# Patient Record
Sex: FEMALE | Race: WHITE | NOT HISPANIC OR LATINO | URBAN - METROPOLITAN AREA
[De-identification: names, ages, dates, MRNs, and addresses within clinical notes are randomized per-mention and may not be internally consistent; named-entity substitution may affect disease eponyms.]

---

## 2020-09-10 ENCOUNTER — EMERGENCY (EMERGENCY)
Facility: HOSPITAL | Age: 70
LOS: 1 days | Discharge: HOME | End: 2020-09-10
Attending: EMERGENCY MEDICINE | Admitting: EMERGENCY MEDICINE
Payer: OTHER MISCELLANEOUS

## 2020-09-10 VITALS
DIASTOLIC BLOOD PRESSURE: 83 MMHG | OXYGEN SATURATION: 97 % | HEART RATE: 70 BPM | SYSTOLIC BLOOD PRESSURE: 132 MMHG | RESPIRATION RATE: 18 BRPM | TEMPERATURE: 100 F

## 2020-09-10 VITALS
DIASTOLIC BLOOD PRESSURE: 71 MMHG | HEIGHT: 64 IN | WEIGHT: 199.96 LBS | RESPIRATION RATE: 18 BRPM | SYSTOLIC BLOOD PRESSURE: 138 MMHG | TEMPERATURE: 100 F | HEART RATE: 79 BPM | OXYGEN SATURATION: 96 %

## 2020-09-10 DIAGNOSIS — S61.031A PUNCTURE WOUND WITHOUT FOREIGN BODY OF RIGHT THUMB WITHOUT DAMAGE TO NAIL, INITIAL ENCOUNTER: ICD-10-CM
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
## 2020-09-10 DIAGNOSIS — S50.811A ABRASION OF RIGHT FOREARM, INITIAL ENCOUNTER: ICD-10-CM
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
## 2020-09-10 DIAGNOSIS — S51.832A PUNCTURE WOUND WITHOUT FOREIGN BODY OF LEFT FOREARM, INITIAL ENCOUNTER: ICD-10-CM
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
## 2020-09-10 DIAGNOSIS — S51.851A OPEN BITE OF RIGHT FOREARM, INITIAL ENCOUNTER: ICD-10-CM

## 2020-09-10 DIAGNOSIS — S61.235A PUNCTURE WOUND WITHOUT FOREIGN BODY OF LEFT RING FINGER WITHOUT DAMAGE TO NAIL, INITIAL ENCOUNTER: ICD-10-CM

## 2020-09-10 DIAGNOSIS — Z79.2 LONG TERM (CURRENT) USE OF ANTIBIOTICS: ICD-10-CM

## 2020-09-10 LAB
ALBUMIN SERPL ELPH-MCNC: 4.1 G/DL — SIGNIFICANT CHANGE UP (ref 3.3–5)
ALP SERPL-CCNC: 92 U/L — SIGNIFICANT CHANGE UP (ref 40–120)
ALT FLD-CCNC: 13 U/L — SIGNIFICANT CHANGE UP (ref 10–45)
ANION GAP SERPL CALC-SCNC: 13 MMOL/L — SIGNIFICANT CHANGE UP (ref 5–17)
AST SERPL-CCNC: 21 U/L — SIGNIFICANT CHANGE UP (ref 10–40)
BASOPHILS # BLD AUTO: 0.08 K/UL — SIGNIFICANT CHANGE UP (ref 0–0.2)
BASOPHILS NFR BLD AUTO: 0.6 % — SIGNIFICANT CHANGE UP (ref 0–2)
BILIRUB SERPL-MCNC: 0.7 MG/DL — SIGNIFICANT CHANGE UP (ref 0.2–1.2)
BUN SERPL-MCNC: 15 MG/DL — SIGNIFICANT CHANGE UP (ref 7–23)
CALCIUM SERPL-MCNC: 9.5 MG/DL — SIGNIFICANT CHANGE UP (ref 8.4–10.5)
CHLORIDE SERPL-SCNC: 105 MMOL/L — SIGNIFICANT CHANGE UP (ref 96–108)
CO2 SERPL-SCNC: 22 MMOL/L — SIGNIFICANT CHANGE UP (ref 22–31)
CREAT SERPL-MCNC: 0.61 MG/DL — SIGNIFICANT CHANGE UP (ref 0.5–1.3)
EOSINOPHIL # BLD AUTO: 0.06 K/UL — SIGNIFICANT CHANGE UP (ref 0–0.5)
EOSINOPHIL NFR BLD AUTO: 0.4 % — SIGNIFICANT CHANGE UP (ref 0–6)
GLUCOSE SERPL-MCNC: 113 MG/DL — HIGH (ref 70–99)
HCT VFR BLD CALC: 45.8 % — HIGH (ref 34.5–45)
HGB BLD-MCNC: 14.8 G/DL — SIGNIFICANT CHANGE UP (ref 11.5–15.5)
IMM GRANULOCYTES NFR BLD AUTO: 0.6 % — SIGNIFICANT CHANGE UP (ref 0–1.5)
LACTATE SERPL-SCNC: 1 MMOL/L — SIGNIFICANT CHANGE UP (ref 0.5–2)
LYMPHOCYTES # BLD AUTO: 1.47 K/UL — SIGNIFICANT CHANGE UP (ref 1–3.3)
LYMPHOCYTES # BLD AUTO: 10.6 % — LOW (ref 13–44)
MCHC RBC-ENTMCNC: 29.8 PG — SIGNIFICANT CHANGE UP (ref 27–34)
MCHC RBC-ENTMCNC: 32.3 GM/DL — SIGNIFICANT CHANGE UP (ref 32–36)
MCV RBC AUTO: 92.3 FL — SIGNIFICANT CHANGE UP (ref 80–100)
MONOCYTES # BLD AUTO: 0.88 K/UL — SIGNIFICANT CHANGE UP (ref 0–0.9)
MONOCYTES NFR BLD AUTO: 6.3 % — SIGNIFICANT CHANGE UP (ref 2–14)
NEUTROPHILS # BLD AUTO: 11.36 K/UL — HIGH (ref 1.8–7.4)
NEUTROPHILS NFR BLD AUTO: 81.5 % — HIGH (ref 43–77)
NRBC # BLD: 0 /100 WBCS — SIGNIFICANT CHANGE UP (ref 0–0)
PLATELET # BLD AUTO: 298 K/UL — SIGNIFICANT CHANGE UP (ref 150–400)
POTASSIUM SERPL-MCNC: 4 MMOL/L — SIGNIFICANT CHANGE UP (ref 3.5–5.3)
POTASSIUM SERPL-SCNC: 4 MMOL/L — SIGNIFICANT CHANGE UP (ref 3.5–5.3)
PROT SERPL-MCNC: 7.1 G/DL — SIGNIFICANT CHANGE UP (ref 6–8.3)
RBC # BLD: 4.96 M/UL — SIGNIFICANT CHANGE UP (ref 3.8–5.2)
RBC # FLD: 12.9 % — SIGNIFICANT CHANGE UP (ref 10.3–14.5)
SODIUM SERPL-SCNC: 140 MMOL/L — SIGNIFICANT CHANGE UP (ref 135–145)
WBC # BLD: 13.93 K/UL — HIGH (ref 3.8–10.5)
WBC # FLD AUTO: 13.93 K/UL — HIGH (ref 3.8–10.5)

## 2020-09-10 PROCEDURE — 83605 ASSAY OF LACTIC ACID: CPT

## 2020-09-10 PROCEDURE — 99284 EMERGENCY DEPT VISIT MOD MDM: CPT | Mod: 25

## 2020-09-10 PROCEDURE — 80053 COMPREHEN METABOLIC PANEL: CPT

## 2020-09-10 PROCEDURE — 87040 BLOOD CULTURE FOR BACTERIA: CPT

## 2020-09-10 PROCEDURE — 36415 COLL VENOUS BLD VENIPUNCTURE: CPT

## 2020-09-10 PROCEDURE — 85025 COMPLETE CBC W/AUTO DIFF WBC: CPT

## 2020-09-10 PROCEDURE — 73090 X-RAY EXAM OF FOREARM: CPT | Mod: 26,50

## 2020-09-10 PROCEDURE — 73130 X-RAY EXAM OF HAND: CPT

## 2020-09-10 PROCEDURE — 99285 EMERGENCY DEPT VISIT HI MDM: CPT

## 2020-09-10 PROCEDURE — 96374 THER/PROPH/DIAG INJ IV PUSH: CPT

## 2020-09-10 PROCEDURE — 73130 X-RAY EXAM OF HAND: CPT | Mod: 26,50

## 2020-09-10 PROCEDURE — 73090 X-RAY EXAM OF FOREARM: CPT

## 2020-09-10 RX ORDER — AMPICILLIN SODIUM AND SULBACTAM SODIUM 250; 125 MG/ML; MG/ML
3 INJECTION, POWDER, FOR SUSPENSION INTRAMUSCULAR; INTRAVENOUS ONCE
Refills: 0 | Status: COMPLETED | OUTPATIENT
Start: 2020-09-10 | End: 2020-09-10

## 2020-09-10 RX ORDER — PIPERACILLIN AND TAZOBACTAM 4; .5 G/20ML; G/20ML
3.38 INJECTION, POWDER, LYOPHILIZED, FOR SOLUTION INTRAVENOUS ONCE
Refills: 0 | Status: DISCONTINUED | OUTPATIENT
Start: 2020-09-10 | End: 2020-09-10

## 2020-09-10 RX ADMIN — AMPICILLIN SODIUM AND SULBACTAM SODIUM 200 GRAM(S): 250; 125 INJECTION, POWDER, FOR SUSPENSION INTRAMUSCULAR; INTRAVENOUS at 21:04

## 2020-09-10 NOTE — ED PROVIDER NOTE - CARE PLAN
Principal Discharge DX:	Cat bite of hand, initial encounter  Secondary Diagnosis:	Cat bite of multiple sites of left hand and fingers with infection, initial encounter  Secondary Diagnosis:	Cat bite of right hand including fingers with infection, initial encounter

## 2020-09-10 NOTE — ED PROVIDER NOTE - PHYSICAL EXAMINATION
Vitals reviewed  Gen: sitting comfortably in chair, nad, speaking in full sentences  HEENT: ncat, eomi, mmm  CV: rrr, no audible m/r/g  Resp: symmetrical expansion, ctab, no w/r/r  Abd: nondistended, soft/nt  RUE: R thumb w/ swelling/erythema/warmth extending just proximal to MCP- no d/c or crepitus, limited flexion thumb, + puncture wounds over dorsum thumb, 1st metacarpal, numerous abrasions to forearm, FROM wrist/elbow/shoulder, no lymphadenopathy noted, 5/5 strength, radial pulse 2+  LUE: 4th digit w/ swelling/erythema/warmth extending into dorsum of hand- no d/c or crepitus, + puncture wounds to 4th digit at DIP and few to forearm, few abrasions/scratch marks to forearm, full FROM all digits/joints, no lymphadenopathy noted, 5/5 strength, radial pulse 2+  FROM b/l LE, NVI  Neuro: alert/oriented, no focal deficits, steady gait

## 2020-09-10 NOTE — ED PROVIDER NOTE - SECONDARY DIAGNOSIS.
Cat bite of multiple sites of left hand and fingers with infection, initial encounter Cat bite of right hand including fingers with infection, initial encounter

## 2020-09-10 NOTE — ED ADULT TRIAGE NOTE - MEANS OF ARRIVAL
----- Message from Rory Sauceda PA-C sent at 1/4/2019  1:34 PM CST -----  Please call with results. Please ensure that the cholesterol levels returned all within normal range.The kidney function showed a mild elevation in creatinine and reduction in the filtration rate. The cell counts were grossly within normal range. Very mild decrease in total RBC count, but no signs of anemia or infection. I do not recommend changes in the medications at this time. Consider discussing digoxin adjustment with cardiologist based on filtration rate.      Rory Sauceda PA-C  
Spoke with patient understood was not hearing the best so I advised I would send a copy as well so he could see the results. Patient states he will call his cardiologist to follow up.   Caitlin Card MA   
ambulatory

## 2020-09-10 NOTE — ED PROVIDER NOTE - NSFOLLOWUPINSTRUCTIONS_ED_ALL_ED_FT
Take antibiotics as prescribed.  Keep wounds clean and dry.  Return to ED tomorrow for admission for IV antibiotics if you can, otherwise call Dr. Dozier to follow up Monday in his office.  [phone]    RETURN TO ED IMMEDIATELY IF YOU EXPERIENCE FEVER, EXTENDING REDNESS, INCREASED PAIN, VOMITING, CONFUSION OR OTHER CONCERNS     Pasteurella Multocida Infection   Pasteurella multocida is a type of bacteria that can cause a skin infection. The skin infection may spread into the joints, bones, and tissues that connect muscles to bones (tendons). The infection may also spread to:  The surface of the brain (meningitis).The blood. This is rare. If the infection does spread to your blood, you may develop a heart infection (endocarditis).Infection with Pasteurella multocida can be treated with antibiotic medicine. It is important to get treatment as soon as possible so that more serious symptoms or conditions do not develop. This condition cannot spread from person to person (is not contagious).  What are the causes?  This infection is caused by the Pasteurella multocida bacteria. You may become infected through a bite or a scratch from an animal that carries the bacteria in its saliva. You may also become infected after an infected animal licks an open skin wound (like a cut or scratch) or licks near your eyes, nose, or mouth.  The following animals can carry the bacteria:  Cats and dogs. Most cats and many dogs have the bacteria in their mouths.Poultry, such as chickens and turkeys.Livestock, such as cows, horses, and sheep.What increases the risk?  This condition is more likely to develop in people who:  Live with a dog or cat.Work with live poultry or livestock.Have a weak disease-fighting system (immune system).Have a sore or an open wound.Have liver disease.Have a respiratory illness.What are the signs or symptoms?  Symptoms usually start within 24 hours after contact with an infected animal. Symptoms include:  Pain, redness, warmth, and swelling (cellulitis) around the bite, cut, or scratch.Swollen glands near the bite, cut, or scratch.Fluid leaking from the bite, cut, or scratch area.Fever.Symptoms of a more complicated disease may develop later. These may include:  Joint pain. This can make it hard for you to move.Bone pain.How is this diagnosed?  This condition may be diagnosed based on:  Your symptoms and medical history.A physical exam.Removal of fluid samples from a wound or a joint, to be tested for bacteria.Blood tests.Imaging tests, such as CT scan or MRI.How is this treated?  This condition is treated with antibiotic medicines. These medicines may be given by mouth (orally) or through an IV at the hospital, depending on the severity and location of your infection. You may also need a tetanus shot to help prevent further infection. If you have a wound, such as a bite or scratch, your health care provider may cover it with a bandage (dressing).  Follow these instructions at home:  Medicines     Take your antibiotic medicine as told by your health care provider. Do not stop taking the antibiotic even if you start to feel better.Take other over-the-counter and prescription medicines only as told by your health care provider.Wound care     If you have a wound, follow instructions from your health care provider about how to take care of your wound. Make sure you:  Wash your hands with soap and water before you change your dressing. If soap and water are not available, use hand .Change your dressing as told by your health care provider.Check your wound every day for signs that the infection is getting worse. Check for:  More redness, swelling, or pain.Fluid or blood.Warmth.Pus or a bad smell.General instructions        Rest and return to your normal activities as told by your health care provider. Ask your health care provider what activities are safe for you.To prevent future infections:  Avoid close contact with animals, including pets, especially when you have open wounds.Wash your hands with soap and water after every time you have contact with an animal.Keep all follow-up visits as told by your health care provider. This is important.Contact a health care provider if you:  Received a tetanus shot during treatment and you have any of the following at your injection site:  Swelling.Severe pain.Redness.Bleeding.Have any of the following:  More redness, swelling, or pain around your wound.More fluid or blood coming from your wound.Pus or a bad smell coming from your wound. Trouble moving the affected area.Swollen joints.Notice that your wound feels warm to the touch.Get help right away if you have:  A bad headache.A stiff neck.Chest pain.Difficulty breathing.Summary  Pasteurella multocida is a type of bacteria that can cause a skin infection. The skin infection may spread into the joints, bones, and tissues that connect muscles to bones (tendons).You may become infected through a bite or a scratch from an animal that carries the bacteria in its saliva.This condition is treated with antibiotic medicines.Take your antibiotic medicine as told by your health care provider. Do not stop taking the antibiotic even if you start to feel better.If you have a wound, follow instructions from your health care provider about how to take care of your wound.This information is not intended to replace advice given to you by your health care provider. Make sure you discuss any questions you have with your health care provider. Take antibiotics as prescribed.  Keep wounds clean and dry.  Return to ED tomorrow for reassessment and possible admission for IV antibiotics if you can, otherwise call Dr. Dozier to follow up Monday in his office.  [phone]    RETURN TO ED IMMEDIATELY IF YOU EXPERIENCE FEVER, EXTENDING REDNESS, INCREASED PAIN, VOMITING, CONFUSION OR OTHER CONCERNS     Pasteurella Multocida Infection   Pasteurella multocida is a type of bacteria that can cause a skin infection. The skin infection may spread into the joints, bones, and tissues that connect muscles to bones (tendons). The infection may also spread to:  The surface of the brain (meningitis).The blood. This is rare. If the infection does spread to your blood, you may develop a heart infection (endocarditis).Infection with Pasteurella multocida can be treated with antibiotic medicine. It is important to get treatment as soon as possible so that more serious symptoms or conditions do not develop. This condition cannot spread from person to person (is not contagious).  What are the causes?  This infection is caused by the Pasteurella multocida bacteria. You may become infected through a bite or a scratch from an animal that carries the bacteria in its saliva. You may also become infected after an infected animal licks an open skin wound (like a cut or scratch) or licks near your eyes, nose, or mouth.  The following animals can carry the bacteria:  Cats and dogs. Most cats and many dogs have the bacteria in their mouths.Poultry, such as chickens and turkeys.Livestock, such as cows, horses, and sheep.What increases the risk?  This condition is more likely to develop in people who:  Live with a dog or cat.Work with live poultry or livestock.Have a weak disease-fighting system (immune system).Have a sore or an open wound.Have liver disease.Have a respiratory illness.What are the signs or symptoms?  Symptoms usually start within 24 hours after contact with an infected animal. Symptoms include:  Pain, redness, warmth, and swelling (cellulitis) around the bite, cut, or scratch.Swollen glands near the bite, cut, or scratch.Fluid leaking from the bite, cut, or scratch area.Fever.Symptoms of a more complicated disease may develop later. These may include:  Joint pain. This can make it hard for you to move.Bone pain.How is this diagnosed?  This condition may be diagnosed based on:  Your symptoms and medical history.A physical exam.Removal of fluid samples from a wound or a joint, to be tested for bacteria.Blood tests.Imaging tests, such as CT scan or MRI.How is this treated?  This condition is treated with antibiotic medicines. These medicines may be given by mouth (orally) or through an IV at the hospital, depending on the severity and location of your infection. You may also need a tetanus shot to help prevent further infection. If you have a wound, such as a bite or scratch, your health care provider may cover it with a bandage (dressing).  Follow these instructions at home:  Medicines     Take your antibiotic medicine as told by your health care provider. Do not stop taking the antibiotic even if you start to feel better.Take other over-the-counter and prescription medicines only as told by your health care provider.Wound care     If you have a wound, follow instructions from your health care provider about how to take care of your wound. Make sure you:  Wash your hands with soap and water before you change your dressing. If soap and water are not available, use hand .Change your dressing as told by your health care provider.Check your wound every day for signs that the infection is getting worse. Check for:  More redness, swelling, or pain.Fluid or blood.Warmth.Pus or a bad smell.General instructions        Rest and return to your normal activities as told by your health care provider. Ask your health care provider what activities are safe for you.To prevent future infections:  Avoid close contact with animals, including pets, especially when you have open wounds.Wash your hands with soap and water after every time you have contact with an animal.Keep all follow-up visits as told by your health care provider. This is important.Contact a health care provider if you:  Received a tetanus shot during treatment and you have any of the following at your injection site:  Swelling.Severe pain.Redness.Bleeding.Have any of the following:  More redness, swelling, or pain around your wound.More fluid or blood coming from your wound.Pus or a bad smell coming from your wound. Trouble moving the affected area.Swollen joints.Notice that your wound feels warm to the touch.Get help right away if you have:  A bad headache.A stiff neck.Chest pain.Difficulty breathing.Summary  Pasteurella multocida is a type of bacteria that can cause a skin infection. The skin infection may spread into the joints, bones, and tissues that connect muscles to bones (tendons).You may become infected through a bite or a scratch from an animal that carries the bacteria in its saliva.This condition is treated with antibiotic medicines.Take your antibiotic medicine as told by your health care provider. Do not stop taking the antibiotic even if you start to feel better.If you have a wound, follow instructions from your health care provider about how to take care of your wound.This information is not intended to replace advice given to you by your health care provider. Make sure you discuss any questions you have with your health care provider.

## 2020-09-10 NOTE — ED PROVIDER NOTE - CLINICAL SUMMARY MEDICAL DECISION MAKING FREE TEXT BOX
69 healthy F p/w multiple cat bites/scratches after a cat attacked her at [facility] ([address]  pt with multiple bite/scratch marks and cellulitis of R thumb and L 4th digit.  cat is house cat but vaccine status unknown, pt will call tomorrow to find out vaccine status so no rabies vaccine/immunoglobulin at this time.  will obtain labs/cultures, xrays and give IV abx.  pt adamant that she cannot 69 healthy F p/w multiple cat bites/scratches after a cat attacked her at [facility] ([address]  pt with multiple bite/scratch marks and cellulitis of R thumb and L 4th digit.  cat is house cat but vaccine status unknown, pt will call tomorrow to find out vaccine status so no rabies vaccine/immunoglobulin at this time.  will obtain labs/cultures, xrays and give IV abx.  pt adamant that she cannot stay for admission but amenable to w/u.  d/w hand Dr. Dozier, will give dose unasyn here and d/c with rx augmentin xr, she should return to ED if possible for admission or f/u w/ Dr. Dozier tomorrow or Monday.  plan to AMA if pt does not change her mind

## 2020-09-10 NOTE — ED PROVIDER NOTE - ATTENDING CONTRIBUTION TO CARE
69F no PMH p/w multiple cat scratch/bites to b/l UE. Occurred in the morning, copiously irrigated. Came to ED bec she noticed worsening pain/redness/swelling and decreased ability to range fingers. No other systemic symptoms. States she is able to easily find out about vaccine status of cat. Oral temp 99.9, other vitals wnl. Exam as above. Very well appearing.  In ED:  XR w/ no acute pathology. Labs grossly wnl. Received IV abx.  ddx: Cellulitis, clinically not nec fasc or abscess.   d/w pt extensively high concern for infection and possibility of rapid spread which can lead to (but not limited to) limb loss, permanent disability or death. PT still wants to go home bec she has things she needs to take care of. PA d/w Dr. sinha to assit w/ outpt f/u. Will start augmentin. Advised pt to return to ED asap for reassessment (I explained likely admission but possible dc at that time if much improved on re-eval).   The pt is clinically sober, A&Ox3, free from distracting injury.  Throughout our interactions in the Emergency Department today, the pt has demonstrated concrete thinking/reasoning, has maintained an orderly & reasonable conversation, appears to have intact insight/judgment/reason, a sound mind & therefore in my opinion has capacity now to make decisions.  Given the pt’s presentation, we explained, in laymen's terms, our concern for rapidly spreading infection.  The pt verbalized an understanding of my worries. I've communicated to the pt that the ED evaluation is incomplete.  We have discussed the need for further ED/hospital w/u.  We have reviewed the range of possible dx, potential testing & tx options.  Our discussions included the potential outcomes of leaving AMA, including worsening of their condition, becoming permanently disabled/in pain/critically ill, & death.  Despite these efforts, we were unable to persuade the pt to stay.  The pt is refusing any further ED care & is leaving AMA. We have attempted to offer tx/rx/guidance for any dangerous conditions which are most likely and/or dangerous.  We have answered all questions & have implored the pt to return to the ED ASAP to complete the w/u.

## 2020-09-10 NOTE — ED PROVIDER NOTE - PATIENT PORTAL LINK FT
You can access the FollowMyHealth Patient Portal offered by [facility] by registering at the following website: [email or web address]. By joining [employer]’s FollowMyHealth portal, you will also be able to view your health information using other applications (apps) compatible with our system.

## 2020-09-10 NOTE — ED PROVIDER NOTE - OBJECTIVE STATEMENT
69 healthy F p/w multiple cat bites/scratches after a cat attacked her at [facility] ([address]  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  She immediately washed out wounds with water and betadine then placed bandages on them.  After work she went to  and was given one dose PO augmentin and referred to ED for rabies vaccine.  Pt reports since incident she noticed swelling/redness and pain w/ ROM R thumb and L ring finger.  Denies f/c, discharge from wound, numbness, weakness, tingling, chest pain, sob, abd pain, nvd, urinary sxs, other injuries.

## 2020-09-11 ENCOUNTER — EMERGENCY (EMERGENCY)
Facility: HOSPITAL | Age: 70
LOS: 1 days | Discharge: HOME | End: 2020-09-11
Attending: EMERGENCY MEDICINE | Admitting: EMERGENCY MEDICINE
Payer: OTHER MISCELLANEOUS

## 2020-09-11 VITALS
TEMPERATURE: 99 F | WEIGHT: 199.96 LBS | OXYGEN SATURATION: 100 % | DIASTOLIC BLOOD PRESSURE: 86 MMHG | HEART RATE: 77 BPM | HEIGHT: 64 IN | RESPIRATION RATE: 18 BRPM | SYSTOLIC BLOOD PRESSURE: 146 MMHG

## 2020-09-11 PROCEDURE — 96374 THER/PROPH/DIAG INJ IV PUSH: CPT

## 2020-09-11 PROCEDURE — 99284 EMERGENCY DEPT VISIT MOD MDM: CPT | Mod: 25

## 2020-09-11 PROCEDURE — 99284 EMERGENCY DEPT VISIT MOD MDM: CPT

## 2020-09-11 RX ORDER — AMPICILLIN SODIUM AND SULBACTAM SODIUM 250; 125 MG/ML; MG/ML
3 INJECTION, POWDER, FOR SUSPENSION INTRAMUSCULAR; INTRAVENOUS ONCE
Refills: 0 | Status: COMPLETED | OUTPATIENT
Start: 2020-09-11 | End: 2020-09-11

## 2020-09-11 RX ADMIN — AMPICILLIN SODIUM AND SULBACTAM SODIUM 200 GRAM(S): 250; 125 INJECTION, POWDER, FOR SUSPENSION INTRAMUSCULAR; INTRAVENOUS at 15:13

## 2020-09-11 NOTE — ED PROVIDER NOTE - MUSCULOSKELETAL, MLM
right hand 1st phalanx +erythema and swelling, +FROM, able to flex and extend, no pain with passive flexion, multiple scratches to forearm, no proximal streaking.  left hand +erythema and swelling to 4th digit, +FROM, no tenderness to palpation, no proximal streaking

## 2020-09-11 NOTE — ED ADULT NURSE NOTE - NSIMPLEMENTINTERV_GEN_ALL_ED
Implemented All Universal Safety Interventions:  [place] to call system. Call bell, personal items and telephone within reach. Instruct patient to call for assistance. Room bathroom lighting operational. Non-slip footwear when patient is off stretcher. Physically safe environment: no spills, clutter or unnecessary equipment. Stretcher in lowest position, wheels locked, appropriate side rails in place.

## 2020-09-11 NOTE — ED PROVIDER NOTE - OBJECTIVE STATEMENT
70 y/o f no pmh presents for wound check of cat bites/scratches.  Pt was in ED yesterday after the incident which was about 24 hrs ago.  Pt reports she was advised to be admitted yesterday for IV abx but couldn't stay, received a dose of IV abx yesterday and took 2 doses of augmentin at home.  Pt stating all wounds have improved, still has swelling to right thumb and left 4th finger which are improved compared to yesterday.  Denies numbness/tingling to ext, fever, chills, all other ROS negative.

## 2020-09-11 NOTE — ED ADULT NURSE NOTE - OBJECTIVE STATEMENT
Pt reports cat bites and was sent in for antibiotics, pt reports "it feels a better than before and I feel like I can move my fingers easier." Pt denies chills, fever. Multiple bites, abrasions to bilateral arms.

## 2020-09-11 NOTE — ED PROVIDER NOTE - CLINICAL SUMMARY MEDICAL DECISION MAKING FREE TEXT BOX
70 y/o f presents for wound check of cat bites/scratches; pt afebrile, vss, has cellulitis on both hands although reports improvement since yesterday, no indication for admission, given dose of unasyn while in ED, will dc, continue augmentin at home, return to ED tomorrow for wound check, hand surgery f/u next week.

## 2020-09-11 NOTE — ED ADULT TRIAGE NOTE - CHIEF COMPLAINT QUOTE
Patient presents after being told to return for IV antibiotics after being bitten and scratched by a cat.  Rabies UTD.

## 2020-09-11 NOTE — ED PROVIDER NOTE - PATIENT PORTAL LINK FT
You can access the FollowMyHealth Patient Portal offered by [facility] by registering at the following website: [email or web address]. By joining [employer]’s FollowMyHealth portal, you will also be able to view your health information using other applications (apps) compatible with our system.

## 2020-09-12 ENCOUNTER — EMERGENCY (EMERGENCY)
Facility: HOSPITAL | Age: 70
LOS: 1 days | Discharge: HOME | End: 2020-09-12
Attending: EMERGENCY MEDICINE | Admitting: EMERGENCY MEDICINE
Payer: OTHER MISCELLANEOUS

## 2020-09-12 ENCOUNTER — INPATIENT (INPATIENT)
Facility: HOSPITAL | Age: 70
LOS: 1 days | Discharge: HOME | DRG: 603 | End: 2020-09-14
Attending: HOSPITALIST | Admitting: INTERNAL MEDICINE
Payer: OTHER MISCELLANEOUS

## 2020-09-12 VITALS
WEIGHT: 199.96 LBS | HEART RATE: 65 BPM | OXYGEN SATURATION: 97 % | TEMPERATURE: 98 F | RESPIRATION RATE: 18 BRPM | SYSTOLIC BLOOD PRESSURE: 126 MMHG | HEIGHT: 64 IN | DIASTOLIC BLOOD PRESSURE: 62 MMHG

## 2020-09-12 VITALS
OXYGEN SATURATION: 98 % | HEART RATE: 58 BPM | TEMPERATURE: 98 F | SYSTOLIC BLOOD PRESSURE: 162 MMHG | RESPIRATION RATE: 18 BRPM | DIASTOLIC BLOOD PRESSURE: 98 MMHG

## 2020-09-12 VITALS
HEIGHT: 64 IN | SYSTOLIC BLOOD PRESSURE: 122 MMHG | RESPIRATION RATE: 18 BRPM | OXYGEN SATURATION: 96 % | TEMPERATURE: 99 F | WEIGHT: 199.96 LBS | DIASTOLIC BLOOD PRESSURE: 56 MMHG | HEART RATE: 60 BPM

## 2020-09-12 DIAGNOSIS — S61.451A OPEN BITE OF RIGHT HAND, INITIAL ENCOUNTER: ICD-10-CM

## 2020-09-12 DIAGNOSIS — S60.511A ABRASION OF RIGHT HAND, INITIAL ENCOUNTER: ICD-10-CM

## 2020-09-12 DIAGNOSIS — Z90.49 ACQUIRED ABSENCE OF OTHER SPECIFIED PARTS OF DIGESTIVE TRACT: Chronic | ICD-10-CM

## 2020-09-12 DIAGNOSIS — S60.512A ABRASION OF LEFT HAND, INITIAL ENCOUNTER: ICD-10-CM

## 2020-09-12 DIAGNOSIS — L08.9 LOCAL INFECTION OF THE SKIN AND SUBCUTANEOUS TISSUE, UNSPECIFIED: ICD-10-CM

## 2020-09-12 DIAGNOSIS — M19.90 UNSPECIFIED OSTEOARTHRITIS, UNSPECIFIED SITE: ICD-10-CM
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
## 2020-09-12 DIAGNOSIS — Z98.51 TUBAL LIGATION STATUS: Chronic | ICD-10-CM
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
## 2020-09-12 DIAGNOSIS — Z87.828 PERSONAL HISTORY OF OTHER (HEALED) PHYSICAL INJURY AND TRAUMA: Chronic | ICD-10-CM

## 2020-09-12 DIAGNOSIS — S50.812A ABRASION OF LEFT FOREARM, INITIAL ENCOUNTER: ICD-10-CM

## 2020-09-12 DIAGNOSIS — S50.811A ABRASION OF RIGHT FOREARM, INITIAL ENCOUNTER: ICD-10-CM

## 2020-09-12 DIAGNOSIS — S61.452A OPEN BITE OF LEFT HAND, INITIAL ENCOUNTER: ICD-10-CM

## 2020-09-12 DIAGNOSIS — S61.051A OPEN BITE OF RIGHT THUMB WITHOUT DAMAGE TO NAIL, INITIAL ENCOUNTER: ICD-10-CM

## 2020-09-12 DIAGNOSIS — L03.019 CELLULITIS OF UNSPECIFIED FINGER: ICD-10-CM
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
## 2020-09-12 DIAGNOSIS — R63.8 OTHER SYMPTOMS AND SIGNS CONCERNING FOOD AND FLUID INTAKE: ICD-10-CM
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
## 2020-09-12 LAB
ALBUMIN SERPL ELPH-MCNC: 3.9 G/DL — SIGNIFICANT CHANGE UP (ref 3.3–5)
ALP SERPL-CCNC: 96 U/L — SIGNIFICANT CHANGE UP (ref 40–120)
ALT FLD-CCNC: 14 U/L — SIGNIFICANT CHANGE UP (ref 10–45)
ANION GAP SERPL CALC-SCNC: 11 MMOL/L — SIGNIFICANT CHANGE UP (ref 5–17)
AST SERPL-CCNC: 21 U/L — SIGNIFICANT CHANGE UP (ref 10–40)
BASOPHILS # BLD AUTO: 0.06 K/UL — SIGNIFICANT CHANGE UP (ref 0–0.2)
BASOPHILS # BLD AUTO: 0.07 K/UL — SIGNIFICANT CHANGE UP (ref 0–0.2)
BASOPHILS NFR BLD AUTO: 0.7 % — SIGNIFICANT CHANGE UP (ref 0–2)
BASOPHILS NFR BLD AUTO: 0.9 % — SIGNIFICANT CHANGE UP (ref 0–2)
BILIRUB SERPL-MCNC: 0.4 MG/DL — SIGNIFICANT CHANGE UP (ref 0.2–1.2)
BUN SERPL-MCNC: 15 MG/DL — SIGNIFICANT CHANGE UP (ref 7–23)
CALCIUM SERPL-MCNC: 9.4 MG/DL — SIGNIFICANT CHANGE UP (ref 8.4–10.5)
CHLORIDE SERPL-SCNC: 107 MMOL/L — SIGNIFICANT CHANGE UP (ref 96–108)
CO2 SERPL-SCNC: 24 MMOL/L — SIGNIFICANT CHANGE UP (ref 22–31)
CREAT SERPL-MCNC: 0.67 MG/DL — SIGNIFICANT CHANGE UP (ref 0.5–1.3)
EOSINOPHIL # BLD AUTO: 0.17 K/UL — SIGNIFICANT CHANGE UP (ref 0–0.5)
EOSINOPHIL # BLD AUTO: 0.2 K/UL — SIGNIFICANT CHANGE UP (ref 0–0.5)
EOSINOPHIL NFR BLD AUTO: 2.2 % — SIGNIFICANT CHANGE UP (ref 0–6)
EOSINOPHIL NFR BLD AUTO: 2.3 % — SIGNIFICANT CHANGE UP (ref 0–6)
GLUCOSE SERPL-MCNC: 101 MG/DL — HIGH (ref 70–99)
HCT VFR BLD CALC: 39.7 % — SIGNIFICANT CHANGE UP (ref 34.5–45)
HCT VFR BLD CALC: 41.3 % — SIGNIFICANT CHANGE UP (ref 34.5–45)
HGB BLD-MCNC: 12.7 G/DL — SIGNIFICANT CHANGE UP (ref 11.5–15.5)
HGB BLD-MCNC: 13.5 G/DL — SIGNIFICANT CHANGE UP (ref 11.5–15.5)
IMM GRANULOCYTES NFR BLD AUTO: 0.3 % — SIGNIFICANT CHANGE UP (ref 0–1.5)
IMM GRANULOCYTES NFR BLD AUTO: 0.5 % — SIGNIFICANT CHANGE UP (ref 0–1.5)
LYMPHOCYTES # BLD AUTO: 1.82 K/UL — SIGNIFICANT CHANGE UP (ref 1–3.3)
LYMPHOCYTES # BLD AUTO: 1.86 K/UL — SIGNIFICANT CHANGE UP (ref 1–3.3)
LYMPHOCYTES # BLD AUTO: 21.2 % — SIGNIFICANT CHANGE UP (ref 13–44)
LYMPHOCYTES # BLD AUTO: 23.1 % — SIGNIFICANT CHANGE UP (ref 13–44)
MCHC RBC-ENTMCNC: 29.7 PG — SIGNIFICANT CHANGE UP (ref 27–34)
MCHC RBC-ENTMCNC: 30.4 PG — SIGNIFICANT CHANGE UP (ref 27–34)
MCHC RBC-ENTMCNC: 32 GM/DL — SIGNIFICANT CHANGE UP (ref 32–36)
MCHC RBC-ENTMCNC: 32.7 GM/DL — SIGNIFICANT CHANGE UP (ref 32–36)
MCV RBC AUTO: 92.8 FL — SIGNIFICANT CHANGE UP (ref 80–100)
MCV RBC AUTO: 93 FL — SIGNIFICANT CHANGE UP (ref 80–100)
MONOCYTES # BLD AUTO: 0.74 K/UL — SIGNIFICANT CHANGE UP (ref 0–0.9)
MONOCYTES # BLD AUTO: 0.86 K/UL — SIGNIFICANT CHANGE UP (ref 0–0.9)
MONOCYTES NFR BLD AUTO: 9.4 % — SIGNIFICANT CHANGE UP (ref 2–14)
MONOCYTES NFR BLD AUTO: 9.8 % — SIGNIFICANT CHANGE UP (ref 2–14)
NEUTROPHILS # BLD AUTO: 5.03 K/UL — SIGNIFICANT CHANGE UP (ref 1.8–7.4)
NEUTROPHILS # BLD AUTO: 5.75 K/UL — SIGNIFICANT CHANGE UP (ref 1.8–7.4)
NEUTROPHILS NFR BLD AUTO: 63.9 % — SIGNIFICANT CHANGE UP (ref 43–77)
NEUTROPHILS NFR BLD AUTO: 65.7 % — SIGNIFICANT CHANGE UP (ref 43–77)
NRBC # BLD: 0 /100 WBCS — SIGNIFICANT CHANGE UP (ref 0–0)
NRBC # BLD: 0 /100 WBCS — SIGNIFICANT CHANGE UP (ref 0–0)
PLATELET # BLD AUTO: 279 K/UL — SIGNIFICANT CHANGE UP (ref 150–400)
PLATELET # BLD AUTO: 293 K/UL — SIGNIFICANT CHANGE UP (ref 150–400)
POTASSIUM SERPL-MCNC: 3.8 MMOL/L — SIGNIFICANT CHANGE UP (ref 3.5–5.3)
POTASSIUM SERPL-SCNC: 3.8 MMOL/L — SIGNIFICANT CHANGE UP (ref 3.5–5.3)
PROT SERPL-MCNC: 6.9 G/DL — SIGNIFICANT CHANGE UP (ref 6–8.3)
RBC # BLD: 4.28 M/UL — SIGNIFICANT CHANGE UP (ref 3.8–5.2)
RBC # BLD: 4.44 M/UL — SIGNIFICANT CHANGE UP (ref 3.8–5.2)
RBC # FLD: 12.8 % — SIGNIFICANT CHANGE UP (ref 10.3–14.5)
RBC # FLD: 13.1 % — SIGNIFICANT CHANGE UP (ref 10.3–14.5)
SARS-COV-2 RNA SPEC QL NAA+PROBE: SIGNIFICANT CHANGE UP
SODIUM SERPL-SCNC: 142 MMOL/L — SIGNIFICANT CHANGE UP (ref 135–145)
WBC # BLD: 7.87 K/UL — SIGNIFICANT CHANGE UP (ref 3.8–10.5)
WBC # BLD: 8.76 K/UL — SIGNIFICANT CHANGE UP (ref 3.8–10.5)
WBC # FLD AUTO: 7.87 K/UL — SIGNIFICANT CHANGE UP (ref 3.8–10.5)
WBC # FLD AUTO: 8.76 K/UL — SIGNIFICANT CHANGE UP (ref 3.8–10.5)

## 2020-09-12 PROCEDURE — 36415 COLL VENOUS BLD VENIPUNCTURE: CPT

## 2020-09-12 PROCEDURE — 99284 EMERGENCY DEPT VISIT MOD MDM: CPT | Mod: 25

## 2020-09-12 PROCEDURE — 99222 1ST HOSP IP/OBS MODERATE 55: CPT | Mod: GC

## 2020-09-12 PROCEDURE — 85025 COMPLETE CBC W/AUTO DIFF WBC: CPT

## 2020-09-12 PROCEDURE — 99284 EMERGENCY DEPT VISIT MOD MDM: CPT

## 2020-09-12 PROCEDURE — 99285 EMERGENCY DEPT VISIT HI MDM: CPT

## 2020-09-12 PROCEDURE — 96365 THER/PROPH/DIAG IV INF INIT: CPT

## 2020-09-12 RX ORDER — INFLUENZA VIRUS VACCINE 15; 15; 15; 15 UG/.5ML; UG/.5ML; UG/.5ML; UG/.5ML
0.7 SUSPENSION INTRAMUSCULAR ONCE
Refills: 0 | Status: COMPLETED | OUTPATIENT
Start: 2020-09-12 | End: 2020-09-14

## 2020-09-12 RX ORDER — ENOXAPARIN SODIUM 100 MG/ML
40 INJECTION SUBCUTANEOUS DAILY
Refills: 0 | Status: DISCONTINUED | OUTPATIENT
Start: 2020-09-12 | End: 2020-09-14

## 2020-09-12 RX ORDER — AMPICILLIN SODIUM AND SULBACTAM SODIUM 250; 125 MG/ML; MG/ML
3 INJECTION, POWDER, FOR SUSPENSION INTRAMUSCULAR; INTRAVENOUS EVERY 6 HOURS
Refills: 0 | Status: DISCONTINUED | OUTPATIENT
Start: 2020-09-12 | End: 2020-09-14

## 2020-09-12 RX ORDER — INFLUENZA VIRUS VACCINE 15; 15; 15; 15 UG/.5ML; UG/.5ML; UG/.5ML; UG/.5ML
0.5 SUSPENSION INTRAMUSCULAR ONCE
Refills: 0 | Status: DISCONTINUED | OUTPATIENT
Start: 2020-09-12 | End: 2020-09-12

## 2020-09-12 RX ORDER — AMPICILLIN SODIUM AND SULBACTAM SODIUM 250; 125 MG/ML; MG/ML
3 INJECTION, POWDER, FOR SUSPENSION INTRAMUSCULAR; INTRAVENOUS ONCE
Refills: 0 | Status: COMPLETED | OUTPATIENT
Start: 2020-09-12 | End: 2020-09-12

## 2020-09-12 RX ADMIN — ENOXAPARIN SODIUM 40 MILLIGRAM(S): 100 INJECTION SUBCUTANEOUS at 19:35

## 2020-09-12 RX ADMIN — AMPICILLIN SODIUM AND SULBACTAM SODIUM 200 GRAM(S): 250; 125 INJECTION, POWDER, FOR SUSPENSION INTRAMUSCULAR; INTRAVENOUS at 22:12

## 2020-09-12 RX ADMIN — AMPICILLIN SODIUM AND SULBACTAM SODIUM 200 GRAM(S): 250; 125 INJECTION, POWDER, FOR SUSPENSION INTRAMUSCULAR; INTRAVENOUS at 16:38

## 2020-09-12 RX ADMIN — AMPICILLIN SODIUM AND SULBACTAM SODIUM 200 GRAM(S): 250; 125 INJECTION, POWDER, FOR SUSPENSION INTRAMUSCULAR; INTRAVENOUS at 10:36

## 2020-09-12 RX ADMIN — AMPICILLIN SODIUM AND SULBACTAM SODIUM 3 GRAM(S): 250; 125 INJECTION, POWDER, FOR SUSPENSION INTRAMUSCULAR; INTRAVENOUS at 11:09

## 2020-09-12 RX ADMIN — AMPICILLIN SODIUM AND SULBACTAM SODIUM 3 GRAM(S): 250; 125 INJECTION, POWDER, FOR SUSPENSION INTRAMUSCULAR; INTRAVENOUS at 17:36

## 2020-09-12 NOTE — ED PROVIDER NOTE - OBJECTIVE STATEMENT
69F Left handed, with recent cat bites/scratches suffered on Thursday morning where she works in a [employer] office, Pt left ama earlier today to deal with some personal issues and now presents for admission for IV abx. Pt last dose of unasyn was at ~10:20am. Pt states right thumb is improving slightly, no fever or chills, no other complaints. Dr. Paez in the ER and recommends continued IV abx, nothing to drain at this time.

## 2020-09-12 NOTE — ED PROVIDER NOTE - PHYSICAL EXAMINATION
GEN: Well appearing, well nourished, awake, alert, oriented to person, place, time/situation and in no apparent distress. NTAF  ENT: Airway patent, Nasal mucosa clear. Mouth with normal mucosa.  EYES: Clear bilaterally. PERRL, EOMI  RESPIRATORY: Breathing comfortably with normal RR. No W/C/R, no hypoxia or resp distress.  CARDIAC: Regular rate and rhythm, no M/R/G  ABDOMEN: Soft, nontender, +bowel sounds, no rebound, rigidity, or guarding.  MSK: Range of motion is not limited, no deformities noted.  NEURO: Alert and oriented, no focal deficits.  SKIN: Skin normal color for race, warm, dry, multiple abrasions, scratches to upper extremities, Right thumb cellulitis  PSYCH: Alert and oriented to person, place, time/situation. normal mood and affect. no apparent risk to self or others.

## 2020-09-12 NOTE — ED PROVIDER NOTE - CARE PROVIDER_API CALL
Ishan Dozier  PLASTIC SURGERY  [address] [place] [address], [address]  [place] [address]  Phone: [phone]  Fax: [phone]  Follow Up Time: Urgent

## 2020-09-12 NOTE — ED ADULT NURSE NOTE - NSIMPLEMENTINTERV_GEN_ALL_ED
Implemented All Universal Safety Interventions:  [place] to call system. Call bell, personal items and telephone within reach. Instruct patient to call for assistance. Room bathroom lighting operational. Non-slip footwear when patient is off stretcher. Physically safe environment: no spills, clutter or unnecessary equipment. Stretcher in lowest position, wheels locked, appropriate side rails in place.

## 2020-09-12 NOTE — ED PROVIDER NOTE - CLINICAL SUMMARY MEDICAL DECISION MAKING FREE TEXT BOX
infected cat bite. Pt will multiple abrasions and infected right thumb. limited ROM. neurovascular intact. a febrile. cbc noted. unasyn given in ED. case d/w Dr Dozier and recommend admission for IV antibiotics. pt refusing admission. d/w pt risks including permanent disability to hand, loss of limb and death. pt verbalizes understanding and still refusing admission. pt capable of making medical decisions will d/c ama.

## 2020-09-12 NOTE — CONSULT NOTE ADULT - SUBJECTIVE AND OBJECTIVE BOX
patient works at a [employer] and sustained cat bite of right left hands yesterday, was seen in  ER but left AMA after one dose of Unasyn, returned today and has agreed to admission for IV antibiotics  PE today localized erythema around bite wound of right thumb, No drainage, mild swelling,  FROM with tenderness of right thumb.  Left ring finger, mild erythema, ecchymosis of middle phalanx, FROM  X-ray is negative for FB or Fracture

## 2020-09-12 NOTE — ED ADULT NURSE REASSESSMENT NOTE - NS ED NURSE REASSESS COMMENT FT1
Patient a/oX 3, c/o of right thumb pain and redness and swelling w/ tenderness, w/ abrasions, cat scratch marks and puncture wounds on bilateral arms, no fever or chills, no discharge from site.  Vital signs stable.  Left AC PIV #20 in place, all labs , blood cultures sent, no complications.  Unasyn IVPB ;onoging , no adverse rxn.  Seen by Plastic surgeon.  For admit.  Room assignment pending.

## 2020-09-12 NOTE — ED ADULT NURSE REASSESSMENT NOTE - NS ED NURSE REASSESS COMMENT FT1
Patient a/oX 3, c/o of pain on right thumb, redness and swelling and tenderness noted, multiple abrasions and puncture wounds noted on bilateral arms, no fever or chills.  VItal signs stable.  Left AC PIV #22 in place, all labs sent, no complications.  Declines offer of pain med.  Unasyn IV ABT completed; no adverse rxn.  Results and disposition pending.  Stble and comfortable.

## 2020-09-12 NOTE — H&P ADULT - NSICDXPASTSURGICALHX_GEN_ALL_CORE_FT
PAST SURGICAL HISTORY:  H/O tubal ligation Age 41    History of cholecystectomy 15 years ago    History of meniscal tear R knee, repaired

## 2020-09-12 NOTE — ED ADULT NURSE REASSESSMENT NOTE - NS ED NURSE REASSESS COMMENT FT1
Patient a/oX 3, no new pain or symptom complaint, Unasyn IVPB completed, no adverse rxn.  Vital signs stable.  Patient signed AMA w/ [facility];  states will return later after completing some errands and then will return for admission.

## 2020-09-12 NOTE — ED ADULT NURSE NOTE - OBJECTIVE STATEMENT
Patient w/ multiple puncture wounds, abrasions , redness and swelling on bilateral arms, w/ swelling , redness, tenderness and pain on right thumb w/ puncture wound, states was bitten and scratched by a cat while working in [employer] office.  Returns for continuing IV ABT Unasyn therapy day 3, also taking oral antibiotics at home.  Denies any fevers or chills, states wounds not getting worse but not getting better.  UTD on Tetanus vaccine.  Patient returns to ED a few hours after signing AMA earlier;  patient for admit.

## 2020-09-12 NOTE — H&P ADULT - NSICDXPASTMEDICALHX_GEN_ALL_CORE_FT
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 4 days out of every 10 days).

## 2020-09-12 NOTE — ED ADULT NURSE NOTE - OBJECTIVE STATEMENT
Patient w/ multiple puncture wounds, abrasions , redness and swelling on bilateral arms, w/ swelling , redness, tenderness and pain on right thumb w/ puncture wound, states was bitten and scratched by a cat while working in [employer] office.  Returns for continuing IV ABT Unasyn therapy day 3, also taking oral antibiotics at home.  Denies any fevers or chills, states wounds not getting worse but not getting better.  UTD on Tetanus vaccine.

## 2020-09-12 NOTE — H&P ADULT - NSHPSOCIALHISTORY_GEN_ALL_CORE
Lives alone with many cats.   Independent ambulator at home, although walks with cane assist when outside.  Does not endorse any alcohol/drug use/smoking.

## 2020-09-12 NOTE — H&P ADULT - HISTORY OF PRESENT ILLNESS
INCOMPLETE INCOMPLETE    69 healthy F p/w multiple cat bites/scratches after a cat attacked her at [facility] ([address]) on 9/10. 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  She immediately washed out wounds with water and betadine then placed bandages on them.  After work she went to  and was given one dose PO augmentin and referred to ED for rabies vaccine.  Pt reports since incident she noticed swelling/redness and pain w/ ROM R thumb and L ring finger.  Denies f/c, discharge from wound, numbness, weakness, tingling, chest pain, sob, abd pain, nvd, urinary sxs, other injuries    .amoxicillin-clavulanate 1000 mg-62.5 mg oral tablet, extended release: Rx, Schedule: 0, 2 tab(s) orally 2 times a day   69F no PMH p/w multiple cat scratch/bites to b/l UE.  Occurred in the morning, copiously irrigated. Came to ED bec she noticed worsening pain/redness/swelling and decreased ability to range fingers. No other systemic symptoms. States she is able to easily find out about vaccine status of cat.     XR w/ no acute pathology. Labs grossly wnl. Received IV abx.  ddx: Cellulitis, clinically not nec fasc or abscess.     d/w pt extensively high concern for infection and possibility of rapid spread which can lead to (but not limited to) limb loss, permanent disability or death. PT still wants to go home bec she has things she needs to take care of. PA d/w Dr. isnha to assit w/ outpt f/u. Will start augmentin. Advised pt to return to ED asap for reassessment (I explained likely admission but possible dc at that time if much improved on re-eval).     Left handed, with recent cat bites/scratches suffered on Thursday morning where she works in a [employer] office, Pt left ama earlier today to deal with some personal issues and now presents for admission for IV abx. Pt last dose of unasyn was at ~10:20am. Pt states right thumb is improving slightly, no fever or chills, no other complaints. Dr. Paez in the ER and recommends continued IV abx, nothing to drain at this time.       69 healthy F PMH OA, p/w multiple cat bites/scratches after a cat attacked her at [facility] ([address]) on 9/10. 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  She immediately washed out wounds with water and betadine then placed bandages on them. After work she went to  and was given one dose PO augmentin and received tetanus vaccine.  Pt reports since incident she noticed swelling/redness and pain w/ ROM R thumb and L ring finger.  Denied f/c, discharge from wound, numbness, weakness, tingling, chest pain, sob, abd pain, nvd, urinary sxs, other injuries.    Came to ED 9/10, but left in evening to take care of personal things at home, did the same things 9/11, and came in for third time today for reassessment. Received IV augmentin 1x Thurs, 1x Fri, 2x Today; has taken a total of 6 augmentin pills so far in addition. Today, still endorses some pain in her B/l UE where the bites were, and some persistent swelling and difficulty ranging her R thumb.            69 healthy F PMH OA, p/w multiple cat bites/scratches after a cat attacked her at [facility] ([address]) on 9/10. 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  She immediately washed out wounds with water and betadine then placed bandages on them. After work she went to  and was given one dose PO augmentin and received tetanus vaccine.  Pt reports since incident she noticed swelling/redness and pain w/ ROM R thumb and L ring finger.  Denied f/c, discharge from wound, numbness, weakness, tingling, chest pain, sob, abd pain, nvd, urinary sxs, other injuries.    Came to ED 9/10, but left in evening to take care of personal things at home, did the same things 9/11, and came in for third time today for reassessment. Received IV abx 1x Thurs, 1x Fri, 2x Today; has taken a total of 6 augmentin pills so far in addition. Today, still endorses some pain in her B/l UE where the bites were, and some persistent swelling and difficulty ranging her R thumb.

## 2020-09-12 NOTE — H&P ADULT - ATTENDING COMMENTS
69F, no relevant PMHx, presenting with skin infection of fingers related to cat bite/scratches. Imaging negative (X-rays performed day prior) for extending infection. Seen by Hand Surgery - no intervention, but recommendation had been made for IV Abx.     - Likely for DC on PO Abx (Augmentin) pending re-eval by hand surgery.

## 2020-09-12 NOTE — ED ADULT NURSE NOTE - LOCATION
Acute Care - Occupational Therapy Treatment Note  [facility]     Patient Name: Massimo Bentley  : 1940  MRN: [id number]  Today's Date: 2018  Onset of Illness/Injury or Date of Surgery: 18  Date of Referral to OT: 18  Referring Physician: Dr. Gutierrez approved restarting OT today.     Admit Date: 2018       ICD-10-CM ICD-9-CM   1. Congestive heart failure, unspecified congestive heart failure chronicity, unspecified congestive heart failure type (CMS/[facility]) I50.9 428.0   2. Warfarin-induced coagulopathy (CMS/HCC) D68.9 286.9    T45.515A E934.2   3. Hypoxemia R09.02 799.02   4. Impaired functional mobility, balance, gait, and endurance Z74.09 V49.89   5. Impaired mobility and activities of daily living Z74.09 799.89     Patient Active Problem List   Diagnosis   • Long-term (current) use of anticoagulants   • Atrial fibrillation [I48.91]   • Embolism and thrombosis of artery (CMS/[facility])   • PVD (peripheral vascular disease) (CMS/[facility])   • Tobacco dependence syndrome   • Stroke (CMS/HCC)   • Suicide (CMS/[facility])   • Osteoarthritis of multiple joints   • Hypercholesterolemia   • GERD (gastroesophageal reflux disease)   • Major depression   • Benign prostatic hyperplasia   • Typical atrial flutter (CMS/HCC)   • Non-rheumatic mitral regurgitation   • Hyperlipidemia   • Localized edema   • 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 (CMS/HCC)   • AAA (abdominal aortic aneurysm) without rupture (CMS/[facility])   • CKD (chronic kidney disease) stage 3, GFR 30-59 ml/min   • COPD with exacerbation (CMS/HCC)   • Hypertension   • Prediabetes   • Arterial occlusion, lower extremity (CMS/HCC)   • Hypoxemia   • Warfarin-induced coagulopathy (CMS/HCC)   • LVH (left ventricular hypertrophy)   • Diastolic dysfunction   • Coumadin toxicity     Past Medical History:   Diagnosis Date   • Abdominal bloating    • Anxiety    • Atherosclerosis of native arteries of extremity with intermittent claudication  (CMS/HCC)    • Atrial flutter (CMS/HCC)    • Backache    • Benign prostatic hyperplasia     BX 2009, also has renal cyst      • Chronic bronchitis (CMS/HCC)    • Chronic renal impairment    • Depressive disorder    • Drug abuse, episodic use    • Dyslipidemia    • Fatigue    • GERD (gastroesophageal reflux disease)    • Hypercholesterolemia 03/15/2016   • Injury of tendon of rotator cuff 10/22/2015   • Insomnia 06/24/2016   • Intervertebral disc disorder 10/27/2011   • Major depression    • Neck pain 01/09/2012   • Osteoarthritis of multiple joints 06/21/2013   • Pain from vascular prosthetic devices, implants and grafts, sequela 12/03/2015   • Pain in left foot 10/27/2015   • Peripheral vascular disease (CMS/HCC) 12/03/2015     LEFT fem-tib bypass, embolectomy 6/2014    10/27/2015    • Senile debility 10/22/2015   • Shoulder girdle symptom 04/25/2016    weakness   • Shoulder pain    • Simple renal cyst 01/01/2011   • Stroke (CMS/HCC)      Echo: L atrial appendage thrombus      • Suicide (CMS/[facility])     at risk for   • Tobacco dependence syndrome 12/03/2015    2014 to E cigarette        Past Surgical History:   Procedure Laterality Date   • ABDOMINAL AORTIC ANEURYSM REPAIR  07/18/2011    Endovascular   • BACK SURGERY     • CARDIAC CATHETERIZATION  01/30/1984    Mixed dominant coronary arterial anatomy. No coronary atherosclerosis. Normal left ventricular function   • CHOLECYSTECTOMY  08/25/2014    Cholecystitis with gangrenous gallbladder   • ENDOSCOPY  03/22/1990    Duodenal ulcer   • ENDOSCOPY AND COLONOSCOPY  10/17/2013    Diverticulum in sigmoid colon & descending colon. Internal & external hemorrhoids found   • ESOPHAGOSCOPY / EGD  10/17/2013    With tube-Esophagitis seen. Biopsy taken. Gastritis in stomach. Biopsy taken. Stenosis in 2nd portion of duodenum. Dilatation performed.   • FEMORAL THROMBECTOMY/EMBOLECTOMY  06/29/2014    Left lower extremity arteriogram. Left popliteal artery endarterectomy. Redo left  femoral to [id number] trunk bypass. Negative pressure wound therapy with placement of Prevena wound V.A.C., left groin, left lower leg   • INJECTION OF MEDICATION  03/15/2016    B12   • INJECTION OF MEDICATION  10/23/2015    Drain/Inject Major Joint    • INJECTION OF MEDICATION  01/13/2016    Kenalog   • LUMBAR LAMINECTOMY     • TRANSESOPHAGEAL ECHOCARDIOGRAM (TEX)  10/06/2015    With color flow-Mild mitral regurg.Left atrium mild dilated.Ef of 55-60%.RV systolic pressure elevated.Trace tricuspid regurg   • TRANSESOPHAGEAL ECHOCARDIOGRAM (TEX)  02/14/2012    Without color flow-[facility] w/ early diast dysfun. AV trileaflet & structurally NRL w/ AR NRL. No regional wall motion abn Est Ef approx 50-55%. M & TR valves NRL w/ mild M & TR regurg Trace -mild pulmonic regurg. Anterior echo free space w/o hemodynamic signif. NRL RV   • TRANSESOPHAGEAL ECHOCARDIOGRAM (TEX)  03/25/2011    Normal LV systolic function with Ef of 50-55%.Left atrium ildly dilated.Moderate mitral regurg.Mitral valve regurg.Intact interatrial septum.No evidence of any cardiac thrombus or pericardial effusion.AV trileaflet       Therapy Treatment          Rehabilitation Treatment Summary     Row Name 07/12/18 1315 07/12/18 0950          Treatment Time/Intention    Discipline occupational therapy assistant  -KD physical therapy assistant  -LN     Document Type therapy note (daily note)  -KD progress note/recertification  -LN     Subjective Information no complaints  -KD complains of;pain  -LN     Mode of Treatment occupational therapy  -KD occupational therapy;physical therapy  -LN     Patient/Family Observations family friend present  -KD  --     Care Plan Review care plan/treatment goals reviewed  -KD  --     Therapy Frequency (PT Clinical Impression)  -- daily  -LN     Therapy Frequency (OT Eval) other (see comments)   3-14x/wk  -KD other (see comments)   5-7 days a week  -LN     Patient Effort fair  -KD --  -LN     Reason Treatment Not Performed   -- other (see comments)   Pt awaiting cardiac consult due to multiple pauses in HR this AM.  -LN     Existing Precautions/Restrictions fall;oxygen therapy device and L/min  -KD fall;oxygen therapy device and L/min;cardiac  -LN     Equipment Issued to Patient gait belt  -KD  --     Recorded by [KD] NALLELY HazelA/L 07/12/18 1431 [LN] Imani Houston, PTA 07/12/18 1319     Row Name 07/12/18 1315 07/12/18 0950          Vital Signs    Pre Systolic BP Rehab 92  -  -LN     Pre Treatment Diastolic BP 62  -KD 50  -LN     Post Systolic BP Rehab  -- 112  -LN     Post Treatment Diastolic BP  -- 70  -LN     Pretreatment Heart Rate (beats/min) 67  -KD 67  -LN     Intratreatment Heart Rate (beats/min)  -- 101  -LN     Posttreatment Heart Rate (beats/min) 80  -  -LN     Pre SpO2 (%) 94  -KD 93  -LN     O2 Delivery Pre Treatment room air  -KD room air  -LN     Intra SpO2 (%)  -- 92  -LN     Post SpO2 (%) 95  -KD 93  -LN     O2 Delivery Post Treatment room air  -KD  --     Pre Patient Position Supine  -KD Supine  -LN     Intra Patient Position Standing  -KD Standing  -LN     Post Patient Position Sitting  -KD Supine  -LN     Recorded by [KD] ALDAIR Hazel/ELLIE 07/12/18 1438 [LN] Imani Houston, PTA 07/12/18 1319     Row Name 07/12/18 1315 07/12/18 0950          Cognitive Assessment/Intervention- PT/OT    Affect/Mental Status (Cognitive) WFL  -KD WFL  -LN     Orientation Status (Cognition) oriented x 3  -KD oriented x 3  -LN     Follows Commands (Cognition) follows two step commands  -KD --  -LN     Safety Deficit (Cognitive) mild deficit  -KD  --     Personal Safety Interventions fall prevention program maintained  -KD  --     Recorded by [KD] ALDAIR Hazel/ELLIE 07/12/18 1438 [LN] Imani Houston, PTA 07/12/18 1319     Row Name 07/12/18 0950             Safety Issues, Functional Mobility    Impairments Affecting Function (Mobility) endurance/activity tolerance;cognition;shortness of breath;strength;pain  -LN       Recorded by [LN] Imani Houston, PTA 07/12/18 1319      Row Name 07/12/18 1315 07/12/18 0950          Bed Mobility Assessment/Treatment    Bed Mobility Assessment/Treatment bed mobility (all) activities;supine-sit;sit-supine  -KD supine-sit-supine  -LN     Scooting/Bridging [place] (Bed Mobility) conditional independence  -KD conditional independence;verbal cues  -LN     Supine-Sit [place] (Bed Mobility) conditional independence  -KD conditional independence  -LN     Supine-Sit-Supine [place] (Bed Mobility) conditional independence  -KD conditional independence  -LN     Assistive Device (Bed Mobility) bed rails;head of bed elevated  -KD bed rails;head of bed elevated  -LN     Recorded by [KD] Coral Carrillo QUINTEROS/L 07/12/18 1438 [LN] Imani Houston, PTA 07/12/18 1319     Row Name 07/12/18 1315 07/12/18 0950          Functional Mobility    Functional Mobility- Ind. Level supervision required  -KD --  -LN     Functional Mobility- Device rolling walker  -KD --  -LN     Functional Mobility-Distance (Feet) 3  -KD  --     Recorded by [KD] Coral Carrillo, QUINTEROS/L 07/12/18 1438 [LN] Imani Houston, PTA 07/12/18 1319     Row Name 07/12/18 1315 07/12/18 0950          Transfer Assessment/Treatment    Transfer Assessment/Treatment sit-stand transfer;stand-sit transfer  -KD --  -LN     Recorded by [KD] Coral Carrillo QUINTEROS/L 07/12/18 1438 [LN] Imani Houston, PTA 07/12/18 1319     Row Name 07/12/18 1315 07/12/18 0950          Sit-Stand Transfer    Sit-Stand [place] (Transfers) contact guard  -KD minimum assist (75% patient effort);contact guard  -LN     Assistive Device (Sit-Stand Transfers) walker, front-wheeled  -KD walker, front-wheeled  -LN     Recorded by [KD] Coral Carrillo QUINTEROS/L 07/12/18 1438 [LN] Imani Houston, PTA 07/12/18 1319     Row Name 07/12/18 1315 07/12/18 0950          Stand-Sit Transfer    Stand-Sit [place] (Transfers) contact guard  -KD contact guard;verbal cues  -LN     Assistive Device  (Stand-Sit Transfers) walker, front-wheeled  -KD walker, front-wheeled  -LN     Recorded by [KD] ALDAIR Hazel/ELLIE 07/12/18 1438 [LN] Imani LYNCH Celso, [facility] 07/12/18 1319     Row Name 07/12/18 0950             Toilet Transfer    Type (Toilet Transfer) --  -LN      [place] Level (Toilet Transfer) --  -LN      Assistive Device (Toilet Transfer) --  -LN      Recorded by [LN] Imani S Celso, [facility] 07/12/18 1319      Row Name 07/12/18 0950             Gait/Stairs Assessment/Training    Gait/Stairs Assessment/Training gait/ambulation assistive device  -LN      [place] Level (Gait) contact guard  -LN      Assistive Device (Gait) walker, front-wheeled  -LN      Distance in Feet (Gait) 55x2  -LN      Pattern (Gait) step-through  -LN      Deviations/Abnormal Patterns (Gait) gait speed decreased  -LN      Bilateral Gait Deviations forward flexed posture  -LN      Recorded by [LN] Imani Houston, [facility] 07/12/18 1319      Row Name 07/12/18 0950             Lower Body Dressing Assessment/Training    Lower Body Dressing [place] Level --  -LN      Lower Body Dressing Position --  -LN      Recorded by [LN] Imani S Celso, [facility] 07/12/18 1319      Row Name 07/12/18 0950             General Assessment (Manual Muscle Testing)    General Manual Muscle Testing (MMT) Assessment --  -LN      Recorded by [LN] Imani Houston, [facility] 07/12/18 1319      Row Name 07/12/18 0950             Lower Extremity Seated Therapeutic Exercise    Performed, Seated Lower Extremity (Therapeutic Exercise) hip flexion/extension;hip abduction/adduction;ankle dorsiflexion/plantarflexion;LAQ (long arc quad), knee extension  -LN      Device, Seated Lower Extremity (Therapeutic Exercise) free weights, cuff   2#  -LN      Exercise Type, Seated Lower Extremity (Therapeutic Exercise) AROM (active range of motion)  -LN      Sets/Reps Detail, Seated Lower Extremity (Therapeutic Exercise) 1/15   20 reps ap  -LN      Recorded by [LN] Imani Houston, [facility] 07/12/18 1319      Row  Name 07/12/18 1315             Therapeutic Exercise    Upper Extremity Range of Motion (Therapeutic Exercise) shoulder flexion/extension, bilateral;shoulder abduction/adduction, bilateral;shoulder horizontal abduction/adduction, bilateral;shoulder internal/external rotation, bilateral;elbow flexion/extension, bilateral;forearm supination/pronation, bilateral;wrist flexion/extension, bilateral  -KD      Weight/Resistance (Therapeutic Exercise) 2 pounds  -KD      Exercise Type (Therapeutic Exercise) AROM (active range of motion)  -KD      Sets/Reps (Therapeutic Exercise) 2/20  -KD      Equipment (Therapeutic Exercise) free weight, barbell  -KD      Expected Outcome (Therapeutic Exercise) improve functional tolerance, self-care activity;improve functional tolerance, household activity  -KD      Recorded by [KD] ALDAIR Hazel/L 07/12/18 1438      Row Name 07/12/18 1315 07/12/18 0950          Positioning and Restraints    Pre-Treatment Position in bed  -KD  --     Post Treatment Position bed  -KD bed  -LN     In Bed call light within reach;encouraged to call for assist;supine;exit alarm on  -KD  --     Recorded by [KD] ALDAIR Hazel/L 07/12/18 1438 [LN] Imani Houston, PTA 07/12/18 1319     Row Name 07/12/18 1315 07/12/18 0950          Pain Scale: Numbers Pre/Post-Treatment    Pain Scale: Numbers, Pretreatment 6/10  -KD 3/10  -LN     Pain Scale: Numbers, Post-Treatment 6/10  -KD 2/10  -LN     Pain Location - Side  -- Bilateral  -LN     Pain Location - Orientation lower  -KD upper   lower  -LN     Pain Location back  -KD back  -LN     Pain Intervention(s) Medication (See MAR)  -KD Medication (See MAR)  -LN     Recorded by [KD] ALDAIR Hazel/ELLIE 07/12/18 1438 [LN] Imani Houston, PTA 07/12/18 1319     Row Name 07/12/18 0950             Sensory Assessment/Intervention    Sensory General Assessment --  -LN      Recorded by [LN] Imani Houston, PTA 07/12/18 1319      Row Name 07/12/18 0950             Light Touch  Sensation Assessment    Left Upper Extremity: Light Touch Sensation Assessment --  -LN      Right Upper Extremity: Light Touch Sensation Assessment --  -LN      Recorded by [LN] Imani Houston, PTA 07/12/18 1319      Row Name 07/12/18 0950             Respiratory WDL    Respiratory WDL --  -LN      Rhythm/Pattern, Respiratory --  -LN      Cough Frequency --  -LN      Cough Type --  -LN      Recorded by [LN] Imani LYNCH Celso, PTA 07/12/18 1319      Row Name 07/12/18 0950             Breath Sounds    Breath Sounds --  -LN      All Lung Fields Breath Sounds --  -LN      GAYLE Breath Sounds --  -LN      RUL Breath Sounds --  -LN      RML Breath Sounds --  -LN      Recorded by [LN] Imani Houston, PTA 07/12/18 1319      Row Name 07/12/18 0950             Skin WDL    Skin WDL --  -LN      Skin Color/Characteristics --  -LN      Skin Temperature --  -LN      Skin Moisture --  -LN      Skin Elasticity --  -LN      Skin Integrity --  -LN      Recorded by [LN] Imani LYNCH Celso, PTA 07/12/18 1319      Row Name 07/12/18 0950             Wound 07/07/18 2300 Right lower calf incision;other (see comments)    Wound - Properties Group Date first assessed: 07/07/18 [LC] Time first assessed: 2300 [LC] Present On Admission : yes [LC] Side: Right [LC] Orientation: lower [LC] Location: calf [LC] Type: incision;other (see comments) [LC], healing vascular surgery wound  Additional Comments: cleaned soap and water, santyl, dry gauze, and tape covered BID per [facility] directions [LC] Recorded by:  [LC] Anais Carrillo RN 07/07/18 8734    Dressing Appearance --  -LN      Closure --  -LN      Base --  -LN      Edges --  -LN      Recorded by [LN] Imani LYNCH Celso, PTA 07/12/18 1319      Row Name 07/12/18 0950             Coping    Observed Emotional State --  -LN      Verbalized Emotional State --  -LN      Recorded by [LN] Imani S Celso, PTA 07/12/18 1319      Row Name 07/12/18 0950             Plan of Care Review    Plan of Care Reviewed With patient  -LN      Recorded  by [LN] Imani LYNCH Celso, PTA 07/12/18 1319      Row Name 07/12/18 1315 07/12/18 0950          Outcome Summary/Treatment Plan (OT)    Daily Summary of Progress (OT) progress toward functional goals as expected  -KD  --     Plan for Continued Treatment (OT) cont ot poc  -KD  --     Anticipated Discharge Disposition (OT) skilled nursing facility  -KD --  -LN     Recorded by [KD] ALDAIR Hazel/ELLIE 07/12/18 1438 [LN] Imani S Celso, PTA 07/12/18 1319     Row Name 07/12/18 0950             Outcome Summary/Treatment Plan (PT)    Plan for Continued Treatment (PT) cont   -LN      Anticipated Discharge Disposition (PT) --  -LN      Recorded by [LN] Imani Houston, PTA 07/12/18 1319        User Key  (r) = Recorded By, (t) = Taken By, (c) = Cosigned By    Initials Name Effective Dates Discipline    LC Anais Carrillo RN 10/17/16 -  Nurse    LN Imani S Celso, PTA 03/07/18 -  PT    KD Coral Carrillo, QUINTEROS/L 03/07/18 -  OT        Wound 07/07/18 2300 Right lower calf incision;other (see comments) (Active)   Dressing Appearance dry;intact 7/12/2018  7:44 AM   Base scab 7/12/2018  7:44 AM   Care, Wound cleansed with;soap and water 7/12/2018  7:44 AM   Dressing Care, Wound dressing changed 7/12/2018  7:44 AM             OT Rehab Goals     Row Name 07/12/18 1315             Transfer Goal 1 (OT)    Activity/Assistive Device (Transfer Goal 1, OT) toilet  -KD      [place] Level/Cues Needed (Transfer Goal 1, OT) supervision required  -KD      Time Frame (Transfer Goal 1, OT) long term goal (LTG);by discharge  -KD      Progress/Outcome (Transfer Goal 1, OT) goal revised this date  -KD         Bathing Goal 1 (OT)    Activity/Assistive Device (Bathing Goal 1, OT) bathing skills, all  -KD      [place] Level/Cues Needed (Bathing Goal 1, OT) supervision required  -KD      Time Frame (Bathing Goal 1, OT) long term goal (LTG);by discharge  -KD      Progress/Outcomes (Bathing Goal 1, OT) goal not met  -KD         Dressing Goal 1 (OT)     Activity/Assistive Device (Dressing Goal 1, OT) dressing skills, all  -KD      [place]/Cues Needed (Dressing Goal 1, OT) supervision required  -KD      Time Frame (Dressing Goal 1, OT) long term goal (LTG);by discharge  -KD      Progress/Outcome (Dressing Goal 1, OT) goal not met  -KD         Toileting Goal 1 (OT)    Activity/Device (Toileting Goal 1, OT) toileting skills, all  -KD      [place] Level/Cues Needed (Toileting Goal 1, OT) supervision required  -KD      Time Frame (Toileting Goal 1, OT) long term goal (LTG);by discharge  -KD      Progress/Outcome (Toileting Goal 1, OT) goal not met  -KD         Patient Education Goal (OT)    Activity (Patient Education Goal, OT) B HEP for increased str for mobility and ADLs independence, EC/WS and home safety/fall prevention.  -KD      [place]/Cues/Accuracy (Memory Goal 2, OT) demonstrates adequately;verbalizes understanding  -KD      Time Frame (Patient Education Goal, OT) long term goal (LTG);by discharge  -KD      Progress/Outcome (Patient Education Goal, OT) goal not met  -KD        User Key  (r) = Recorded By, (t) = Taken By, (c) = Cosigned By    Initials Name Provider Type Discipline    ALDAIR Fraser/L Occupational Therapy Assistant OT        Occupational Therapy Education     Title: PT OT SLP Therapies (Active)     Topic: Occupational Therapy (Active)     Point: ADL training (Done)     Description: Instruct learner(s) on proper safety adaptation and remediation techniques during self care or transfers.   Instruct in proper use of assistive devices.   Learning Progress Summary     Learner Status Readiness Method Response Comment Documented by    Patient Done Acceptance E VU,NR Educated about OT. Educated to call for assist and not get up on his own. Educated on safety throughout. Educated on sock aid and reacher use for LB dressing.  07/11/18 2132          Point: Precautions (Done)     Description: Instruct learner(s) on prescribed  precautions during self-care and functional transfers.   Learning Progress Summary     Learner Status Readiness Method Response Comment Documented by    Patient Done Acceptance E BEVERLY,NR Educated about OT. Educated to call for assist and not get up on his own. Educated on safety throughout. Educated on sock aid and reacher use for LB dressing.  07/11/18 1532     Done Acceptance E BEVERLY Edu pt on use of gait belt and non skid socks when OOB and no OOB without assist.  07/09/18 1316                      User Key     Initials Effective Dates Name Provider Type Discipline     06/15/16 -  Dann Cardoso, OT Occupational Therapist OT     06/08/18 -  Tia Dean, OTR/L Occupational Therapist OT                OT Recommendation and Plan  Outcome Summary/Treatment Plan (OT)  Daily Summary of Progress (OT): progress toward functional goals as expected  Plan for Continued Treatment (OT): cont ot poc  Anticipated Discharge Disposition (OT): skilled nursing facility  Therapy Frequency (OT Eval): other (see comments) (3-14x/wk)  Daily Summary of Progress (OT): progress toward functional goals as expected  Plan of Care Review  Plan of Care Reviewed With: patient  Plan of Care Reviewed With: patient  Outcome Summary: No new goals met this date. Cont OT POC        Outcome Measures     Row Name 07/12/18 1315 07/12/18 0950 07/11/18 1445       How much help from another person do you currently need...    Turning from your back to your side while in flat bed without using bedrails?  -- 4  -LN  --    Moving from lying on back to sitting on the side of a flat bed without bedrails?  -- 4  -LN  --    Moving to and from a bed to a chair (including a wheelchair)?  -- 3  -LN  --    Standing up from a chair using your arms (e.g., wheelchair, bedside chair)?  -- 3  -LN  --    Climbing 3-5 steps with a railing?  -- 2  -LN  --    To walk in hospital room?  -- 3  -LN  --    AM-PAC 6 Clicks Score  -- 19  -LN  --       How much help from another  is currently needed...    Putting on and taking off regular lower body clothing? 2  -KD  -- 2  -BH    Bathing (including washing, rinsing, and drying) 2  -KD  -- 2  -BH    Toileting (which includes using toilet bed pan or urinal) 2  -KD  -- 2  -BH    Putting on and taking off regular upper body clothing 3  -KD  -- 3  -BH    Taking care of personal grooming (such as brushing teeth) 3  -KD  -- 3  -BH    Eating meals 4  -KD  -- 4  -BH    Score 16  -KD  -- 16  -BH       Functional Assessment    Outcome Measure Options  -- AM-PAC 6 Clicks Basic Mobility (PT)  -LN AM-PAC 6 Clicks Daily Activity (OT)  -    Row Name 07/11/18 1300 07/11/18 1100          How much help from another person do you currently need...    Turning from your back to your side while in flat bed without using bedrails? 3  -TA 3  -GB     Moving from lying on back to sitting on the side of a flat bed without bedrails? 3  -TA 3  -GB     Moving to and from a bed to a chair (including a wheelchair)? 3  -TA 3  -GB     Standing up from a chair using your arms (e.g., wheelchair, bedside chair)? 3  -TA 3  -GB     Climbing 3-5 steps with a railing? 2  -TA 2  -GB     To walk in hospital room? 3  -TA 3  -GB     AM-PAC 6 Clicks Score 17  -TA 17  -GB        Functional Assessment    Outcome Measure Options AM-PAC 6 Clicks Basic Mobility (PT)  -TA AM-PAC 6 Clicks Basic Mobility (PT)  -GB       User Key  (r) = Recorded By, (t) = Taken By, (c) = Cosigned By    Initials Name Provider Type    GB Leonila Lester, PT Physical Therapist     Tia Dean, OTR/L Occupational Therapist    TA Pepper Enriquez, PTA Physical Therapy Assistant    LN Imani Houston PTA Physical Therapy Assistant    KD Coral Carrillo, QUINTEROS/L Occupational Therapy Assistant           Time Calculation:         Time Calculation- OT     Row Name 07/12/18 1444 07/12/18 1443          Time Calculation- OT    OT Start Time 1315  -KD 1315  -KD     OT Stop Time  -- 1353  -KD     OT Time Calculation  (min)  -- 38 min  -KD     Total Timed Code Minutes- OT  -- 38 minute(s)  -KD     OT Received On  -- 07/12/18  -KD       User Key  (r) = Recorded By, (t) = Taken By, (c) = Cosigned By    Initials Name Provider Type    ALDAIR Fraser/L Occupational Therapy Assistant           Therapy Suggested Charges     Code   Minutes Charges    None           Therapy Charges for Today     Code Description Service Date Service Provider Modifiers Qty    01774805839 HC OT THER PROC EA 15 MIN 7/12/2018 NALLELY HazelA/L GO 1    80546365649 HC OT THERAPEUTIC ACT EA 15 MIN 7/12/2018 Coral Carrillo QUINTEROS/L GO 1    42683927117 HC OT THER PROC EA 15 MIN 7/12/2018 NALLELY HazelA/L GO 2    [id number] HC OT THERAPEUTIC ACT EA 15 MIN 7/12/2018 Coral Carrillo QUINTEROS/L GO 1    [id number] HC OT THER PROC EA 15 MIN 7/12/2018 NALLELY HazelA/L GO 1    [id number] HC OT THERAPEUTIC ACT EA 15 MIN 7/12/2018 ALDAIR Hazel/L GO 1    11640041741 HC OT SELF CARE/MGMT/TRAIN EA 15 MIN 7/12/2018 ALDAIR Hazel/L GO 1          OT G-codes  OT Professional Judgement Used?: Yes  OT Functional Scales Options: AM-PAC 6 Clicks Daily Activity (OT)  Score: 16  Functional Limitation: Self care  Self Care Current Status (): At least 40 percent but less than 60 percent impaired, limited or restricted  Self Care Goal Status (): At least 20 percent but less than 40 percent impaired, limited or restricted    ALDAIR Hazel/ELLIE  7/12/2018   bilateral arms and right thumb

## 2020-09-12 NOTE — ED PROVIDER NOTE - PHYSICAL EXAMINATION
multiple abrasions to b/l hands and forearms. mild swelling and erythema to left 4th finger, FROM. distal NVI. + diffuse swelling and redness to right thumb. limited ROM to right thumb. no fluctuance. no d/c. distal NVI

## 2020-09-12 NOTE — ED PROVIDER NOTE - OBJECTIVE STATEMENT
68 y/o female with recent cat bites here for wound check. pt notes no improvement to right thumb otherwise wounds have been improving. pt states told to come back today for another dose of IV antibiotics. no fever or chills. 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 cats vaccines up to date.

## 2020-09-12 NOTE — H&P ADULT - ASSESSMENT
69 F w/ no PHM, p/w multiple occupation-related cat bites/scratches two days ago, placed on augmentin and AMAd for personal reasons, now returning to ER for further IV abx management for cat bite cellulitos of R thumb and L ring fingers.      69 F w/ no PHM, p/w multiple occupation-related cat bites/scratches two days ago, placed on augmentin and AMAd for personal reasons, now returning to ER for further IV abx management for cat bite cellulitis of R thumb and L ring fingers.

## 2020-09-12 NOTE — H&P ADULT - NSHPPHYSICALEXAM_GEN_ALL_CORE
VITAL SIGNS:  T(C): 37 (09-12-20 @ 15:41), Max: 37 (09-12-20 @ 15:41)  T(F): 98.6 (09-12-20 @ 15:41), Max: 98.6 (09-12-20 @ 15:41)  HR: 60 (09-12-20 @ 15:41) (58 - 65)  BP: 122/56 (09-12-20 @ 15:41) (122/56 - 162/98)  BP(mean): --  RR: 18 (09-12-20 @ 15:41) (18 - 18)  SpO2: 96% (09-12-20 @ 15:41) (96% - 98%)  Wt(kg): --    P  Constitutional: WDWN resting comfortably in bed; NAD  Head: NC/AT  Eyes: PERRL, EOMI, anicteric sclera  ENT: no nasal discharge; uvula midline, no oropharyngeal erythema or exudates; MMM  Neck: supple; no JVD or thyromegaly  Respiratory: CTA B/L; no W/R/R, no retractions  Cardiac: +S1/S2; RRR; no M/R/G; PMI non-displaced  Gastrointestinal: abdomen soft, NT/ND; no rebound or guarding; +BSx4  Back: spine midline, no bony tenderness or step-offs; no CVAT B/L  Extremities: WWP, no clubbing or cyanosis; no peripheral edema  Musculoskeletal: NROM x4; no joint swelling, tenderness or erythema  Vascular: 2+ radial, femoral, DP/PT pulses B/L  Dermatologic: skin warm, dry and intact; no rashes, wounds, or scars  Lymphatic: no submandibular or cervical LAD  Neurologic: AAOx3; CNII-XII grossly intact; no focal deficits  Psychiatric: affect and characteristics of appearance, verbalizations, behaviors are appropriate VITAL SIGNS:  T(C): 37 (09-12-20 @ 15:41), Max: 37 (09-12-20 @ 15:41)  T(F): 98.6 (09-12-20 @ 15:41), Max: 98.6 (09-12-20 @ 15:41)  HR: 60 (09-12-20 @ 15:41) (58 - 65)  BP: 122/56 (09-12-20 @ 15:41) (122/56 - 162/98)  BP(mean): --  RR: 18 (09-12-20 @ 15:41) (18 - 18)  SpO2: 96% (09-12-20 @ 15:41) (96% - 98%)  Wt(kg): --    PHYSICAL EXAM  Constitutional: WDWN resting comfortably in bed; NAD  Head: NC/AT  Eyes: PERRL, EOMI, anicteric sclera  ENT: no nasal discharge; uvula midline, no oropharyngeal erythema or exudates; MMM  Neck: supple; no JVD or thyromegaly  Respiratory: CTA B/L; no W/R/R, no retractions  Cardiac: +S1/S2; RRR; no M/R/G; PMI non-displaced  Gastrointestinal: Large abdomen, soft, NT/ND; no rebound or guarding; +BSx4  Extremities: WWP, no clubbing or cyanosis; peripheral edema in b/l LE, chronic. Numerous small erythematous 1x1 cm scabs in b/l UE on arms from bites, scratch marks x3 on RUE arm, L ring finger with some mild erythema and minimal overlying swelling, TTP. R thumb appearing erythematous and slightly swollen, non-purulent, TTP, non-fluid filled, with limited range of flexion compared to opposite side.   Dermatologic: skin warm, dry and intact; no rashes, wounds, or scars  Neurologic: AAOx3; No pronator drift. Movement of R thumb slightly limited but more due to swelling/pain  Psychiatric: affect and characteristics of appearance, verbalizations, behaviors are appropriate

## 2020-09-12 NOTE — ED PROVIDER NOTE - NSFOLLOWUPINSTRUCTIONS_ED_ALL_ED_FT
Follow up with Dr Trav POWELL                    ANIMAL BITE - AfterCare(R) Instructions(ER/ED)           Animal Bite    WHAT YOU NEED TO KNOW:    Animal bite injuries range from shallow cuts to deep, life-threatening wounds. An animal can cut or puncture the skin when it bites. Your skin may be torn from your body. Your skin may swell or bruise even if the bite does not break the skin. Animal bites occur more often on the hands, arms, legs, and face. Bites from dogs and cats are the most common injuries.    DISCHARGE INSTRUCTIONS:    Return to the emergency department if:   •You have a fever.      •Your wound is red, swollen, and draining pus.      •You see red streaks on the skin around the wound.      •You can no longer move the bitten area.      •Your heartbeat and breathing are much faster than usual.      •You feel dizzy and confused.      Contact your healthcare provider if:   •Your pain does not get better, even after you take pain medicine.      •You have nightmares or flashbacks about the animal bite.       •You have questions or concerns about your condition or care.      Medicines: You may need any of the following:   •Antibiotics prevent or treat a bacterial infection.      •Prescription pain medicine may be given. Ask how to take this medicine safely.      •A tetanus vaccine may be needed to prevent tetanus. Tetanus is a life-threatening bacterial infection that affects the nerves and muscles. The bacteria can be spread through animal bites.       •A rabies vaccine may be needed to prevent rabies. Rabies is a life-threatening viral infection. The virus can be spread through animal bites.      •Take your medicine as directed. Contact your healthcare provider if you think your medicine is not helping or if you have side effects. Tell him of her if you are allergic to any medicine. Keep a list of the medicines, vitamins, and herbs you take. Include the amounts, and when and why you take them. Bring the list or the pill bottles to follow-up visits. Carry your medicine list with you in case of an emergency.      Follow up with your healthcare provider in 1 to 2 days: You may need to return to have your stitches removed. Write down your questions so you remember to ask them during your visits.    Self-care:   •Apply antibiotic ointment as directed. This helps prevent infection in minor skin wounds. It is available without a doctor's order.      •Keep the wound clean and covered. Wash the wound every day with soap and water or germ-killing cleanser. Ask your healthcare provider about the kinds of bandages to use.       •Apply ice on your wound. Ice helps decrease swelling and pain. Ice may also help prevent tissue damage. Use an ice pack, or put crushed ice in a plastic bag. Cover it with a towel and place it on your wound for 15 to 20 minutes every hour or as directed.      •Elevate the wound area. Raise your wound above the level of your heart as often as you can. This will help decrease swelling and pain. Prop your wound on pillows or blankets to keep it elevated comfortably.       Prevent another animal bite:   •Learn to recognize the signs of a scared or angry pet. Avoid quick, sudden movements.      •Do not step between animals that are fighting.      •Do not leave a pet alone with a young child.      •Do not disturb an animal while it eats, sleeps, or cares for its young.      •Do not approach an animal you do not know, especially one that is tied up or caged.      •Stay away from animals that seem sick or act strangely.      •Do not feed or capture wild animals.          © Copyright [employer] 2020           back to top                          © Copyright [employer] 2020

## 2020-09-12 NOTE — ED PROVIDER NOTE - CLINICAL SUMMARY MEDICAL DECISION MAKING FREE TEXT BOX
69F Left handed, with recent cat bites/scratches suffered on Thursday morning where she works in a [employer] office, Pt left ama earlier today to deal with some personal issues and now presents for admission for IV abx.  Cellulitis is improving with IV abx (last dose ~5 hrs ago), pt seen by Dr. Peaz, no indication for drainage at this time. Will continue IV abx and admit to medicine for further mgmt and IV abx. 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  Cellulitis is improving with IV abx (last dose ~5 hrs ago), pt seen by Dr. Paez, no indication for drainage at this time however pt needs continued IV abx. SHe is agreeable to admission at this time. Will continue IV abx and admit to medicine for further mgmt and IV abx.

## 2020-09-12 NOTE — ED PROVIDER NOTE - ATTENDING CONTRIBUTION TO CARE
70 yo female with recent visits for cat bite returns today for wound check.  Pt reports that redness/swelling R hand have improved, R thumb w less swelling, similar difficulty bending thumb, slightly improved redness, no drainage, improved L forearm redness, pain.  Pt has refused admission and has come to ed twice for iv abx.  Pt taking augmentin.  Pt discussed w Dr Dozier on prior visits.  Pt w mult scratches and puncture wounds bilat forearms and hands, R thumb decreased rom 2/2 mild swelling, + erythema, no crepitus, small wound at dip jt w/o drainage, mild ttp.  Pt w cont infection 2/2 cat bite.  Abx ordered, discussed w hand.  We recommend admission but pt declined after discussion of risks to her for worsening infection, tendon involvement, etc; pt signed out ama

## 2020-09-12 NOTE — H&P ADULT - NSHPLABSRESULTS_GEN_ALL_CORE
LABS:                         13.5   7.87  >-----< 293           ( 09-12-20 @ 16:34 )             41.3       142  |  107  |   15  ----------------------< 101    (09-12-20 @ 16:34)     3.8  |  24  |  0.67    Anion Gap: 11    Ca   9.4   (09-12-20 @ 16:34)  Mg   x    (09-12-20 @ 16:34)  Phos x    (09-12-20 @ 16:34)       TP 9.4     |  AST 3.9    -------------------------     Alb x     |  ALT x               (09-12-20 @ 16:34)  -------------------------     T-bili 3.9  |  AlkPh 96    D-bili x        Imaging- reviewed LABS:                         13.5   7.87  >-----< 293           ( 09-12-20 @ 16:34 )             41.3       142  |  107  |   15  ----------------------< 101    (09-12-20 @ 16:34)     3.8  |  24  |  0.67    Anion Gap: 11    Ca   9.4   (09-12-20 @ 16:34)  Mg   x    (09-12-20 @ 16:34)  Phos x    (09-12-20 @ 16:34)       TP 9.4     |  AST 3.9    -------------------------     Alb x     |  ALT x               (09-12-20 @ 16:34)  -------------------------     T-bili 3.9  |  AlkPh 96    D-bili x      Imaging:   Xray Hand 3 Views, Bilateral (09.10.20 @ 20:29) >  impression: No radiopaque soft tissue foreign body. No acute fracture or dislocation. Left third proximal phalangeal bone island.    Xray Forearm, Bilateral (09.10.20 @ 20:29) >  impression: No radiopaque soft tissue foreign body. No acute fracture or dislocation.

## 2020-09-12 NOTE — CONSULT NOTE ADULT - ASSESSMENT
cat bite cellulitis of right thumb and left ring fingers with some improvement since yesterday, but required additional IV antibiotic treatment.

## 2020-09-12 NOTE — CONSULT NOTE ADULT - CONSULT REASON
cat bite cellulitis of right thumb and left ring fingers - c/w daily change  - ileostomy with good output - unlikely to cause her neck pain with associated exertion  - c/w home PPI - AM TSH  - c/w home synthroid

## 2020-09-12 NOTE — ED PROVIDER NOTE - PATIENT PORTAL LINK FT
You can access the FollowMyHealth Patient Portal offered by [facility] by registering at the following website: [email or web address]. By joining [facility]’s FollowMyHealth portal, you will also be able to view your health information using other applications (apps) compatible with our system.

## 2020-09-12 NOTE — H&P ADULT - PROBLEM SELECTOR PLAN 1
#Cellulitis of R thumb and L ring finger  #Cat bite/scratch  #Numerous scratches in UEs  -Pt presenting 2 days ago to ER in context of recent cat scratches and bites, having received augmentin with endorsed interval improvement. Initial dfdx: Cellulitis, clinically not nec fasc or abscess. VSS, nontoxic appearance, and laboratroy findings showing no leukocytosis or any other systemic involvement. Imaging findings unconcerning for any fracture-dislocation/ foreign body involvement.   -Pt already received 1x Unasyn 3 g in ED, continue Q6  -Continue for 2-3 more days, transition to oral  -Monitor patient symptoms

## 2020-09-13 LAB
HCV AB S/CO SERPL IA: 0.05 S/CO — SIGNIFICANT CHANGE UP
HCV AB SERPL-IMP: SIGNIFICANT CHANGE UP

## 2020-09-13 RX ADMIN — AMPICILLIN SODIUM AND SULBACTAM SODIUM 200 GRAM(S): 250; 125 INJECTION, POWDER, FOR SUSPENSION INTRAMUSCULAR; INTRAVENOUS at 15:48

## 2020-09-13 RX ADMIN — AMPICILLIN SODIUM AND SULBACTAM SODIUM 200 GRAM(S): 250; 125 INJECTION, POWDER, FOR SUSPENSION INTRAMUSCULAR; INTRAVENOUS at 21:39

## 2020-09-13 RX ADMIN — ENOXAPARIN SODIUM 40 MILLIGRAM(S): 100 INJECTION SUBCUTANEOUS at 19:18

## 2020-09-13 RX ADMIN — AMPICILLIN SODIUM AND SULBACTAM SODIUM 200 GRAM(S): 250; 125 INJECTION, POWDER, FOR SUSPENSION INTRAMUSCULAR; INTRAVENOUS at 10:16

## 2020-09-13 RX ADMIN — AMPICILLIN SODIUM AND SULBACTAM SODIUM 200 GRAM(S): 250; 125 INJECTION, POWDER, FOR SUSPENSION INTRAMUSCULAR; INTRAVENOUS at 03:18

## 2020-09-13 NOTE — PROGRESS NOTE ADULT - ASSESSMENT
69F, no relevant PMHx, presenting with skin infection of fingers related to cat bite/scratches. Imaging negative (X-rays performed day prior) for extending infection. Seen by Hand Surgery - no intervention, but recommendation had been made for IV Abx.   - Continue unasyn   -F/u blood culture  - Likely for DC on PO Abx (Augmentin) pending re-eval by hand surgery.

## 2020-09-13 NOTE — PROGRESS NOTE ADULT - SUBJECTIVE AND OBJECTIVE BOX
Patient seen and examined at bedside.  No acute event overnight and no new complaints.  Feels some improvement in the cellulitis on both hand, still have challenge with flexion of the right thumb.  Denies fever, chill, cp, palpitation, sob, cough, abd pain, nausea, vomiting, diarrhea, constipation, hematochezia, melena, dysuria.      REVIEW OF SYSTEMS:  All other 12 review of systems is negative except as indicated above    Vital Signs Last 24 Hrs  T(C): 36.5 (13 Sep 2020 05:37), Max: 37 (12 Sep 2020 15:41)  T(F): 97.7 (13 Sep 2020 05:37), Max: 98.6 (12 Sep 2020 15:41)  HR: 50 (13 Sep 2020 05:37) (50 - 60)  BP: 124/76 (13 Sep 2020 05:37) (122/56 - 168/92)  BP(mean): --  RR: 18 (13 Sep 2020 05:37) (17 - 18)  SpO2: 97% (13 Sep 2020 05:37) (96% - 97%)    Physical Examination  General: Well developed; well nourished; in no acute distress  Eyes: moist conjunctivae, sclerae anicteric  HENT: Moist mucous membranes, tongue midline  Neck: Trachea midline, supple  Lungs: Normal respiratory effort and no intercostal retractions  Cardiovascular: RRR, S1S2  Abdomen: Soft, non-tender non-distended; Normal bowel sounds; No rebound or guarding  Extremities: multiple scratch marks on right arm, erythematous with central scab on the right thumb, no exudate or fluctuance, left fourth digit with mild erythema and swelling  Neurological: Alert and oriented x3, nonfocal exam  Skin: Warm and dry    Lab and diagnostic studies reviewed. 5

## 2020-09-14 VITALS
RESPIRATION RATE: 16 BRPM | HEART RATE: 56 BPM | DIASTOLIC BLOOD PRESSURE: 84 MMHG | SYSTOLIC BLOOD PRESSURE: 153 MMHG | TEMPERATURE: 98 F | OXYGEN SATURATION: 96 %

## 2020-09-14 DIAGNOSIS — L03.011 CELLULITIS OF RIGHT FINGER: ICD-10-CM
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
## 2020-09-14 LAB
ANION GAP SERPL CALC-SCNC: 8 MMOL/L — SIGNIFICANT CHANGE UP (ref 5–17)
BUN SERPL-MCNC: 11 MG/DL — SIGNIFICANT CHANGE UP (ref 7–23)
CALCIUM SERPL-MCNC: 9.1 MG/DL — SIGNIFICANT CHANGE UP (ref 8.4–10.5)
CHLORIDE SERPL-SCNC: 107 MMOL/L — SIGNIFICANT CHANGE UP (ref 96–108)
CO2 SERPL-SCNC: 27 MMOL/L — SIGNIFICANT CHANGE UP (ref 22–31)
CREAT SERPL-MCNC: 0.61 MG/DL — SIGNIFICANT CHANGE UP (ref 0.5–1.3)
GLUCOSE BLDC GLUCOMTR-MCNC: 105 MG/DL — HIGH (ref 70–99)
GLUCOSE SERPL-MCNC: 96 MG/DL — SIGNIFICANT CHANGE UP (ref 70–99)
HCT VFR BLD CALC: 37.7 % — SIGNIFICANT CHANGE UP (ref 34.5–45)
HGB BLD-MCNC: 12.3 G/DL — SIGNIFICANT CHANGE UP (ref 11.5–15.5)
MCHC RBC-ENTMCNC: 30.1 PG — SIGNIFICANT CHANGE UP (ref 27–34)
MCHC RBC-ENTMCNC: 32.6 GM/DL — SIGNIFICANT CHANGE UP (ref 32–36)
MCV RBC AUTO: 92.4 FL — SIGNIFICANT CHANGE UP (ref 80–100)
NRBC # BLD: 0 /100 WBCS — SIGNIFICANT CHANGE UP (ref 0–0)
PLATELET # BLD AUTO: 273 K/UL — SIGNIFICANT CHANGE UP (ref 150–400)
POTASSIUM SERPL-MCNC: 4.4 MMOL/L — SIGNIFICANT CHANGE UP (ref 3.5–5.3)
POTASSIUM SERPL-SCNC: 4.4 MMOL/L — SIGNIFICANT CHANGE UP (ref 3.5–5.3)
RBC # BLD: 4.08 M/UL — SIGNIFICANT CHANGE UP (ref 3.8–5.2)
RBC # FLD: 12.7 % — SIGNIFICANT CHANGE UP (ref 10.3–14.5)
SODIUM SERPL-SCNC: 142 MMOL/L — SIGNIFICANT CHANGE UP (ref 135–145)
WBC # BLD: 6.16 K/UL — SIGNIFICANT CHANGE UP (ref 3.8–10.5)
WBC # FLD AUTO: 6.16 K/UL — SIGNIFICANT CHANGE UP (ref 3.8–10.5)

## 2020-09-14 PROCEDURE — 99239 HOSP IP/OBS DSCHRG MGMT >30: CPT | Mod: GC

## 2020-09-14 RX ADMIN — AMPICILLIN SODIUM AND SULBACTAM SODIUM 200 GRAM(S): 250; 125 INJECTION, POWDER, FOR SUSPENSION INTRAMUSCULAR; INTRAVENOUS at 04:40

## 2020-09-14 RX ADMIN — INFLUENZA VIRUS VACCINE 0.7 MILLILITER(S): 15; 15; 15; 15 SUSPENSION INTRAMUSCULAR at 13:28

## 2020-09-14 RX ADMIN — AMPICILLIN SODIUM AND SULBACTAM SODIUM 200 GRAM(S): 250; 125 INJECTION, POWDER, FOR SUSPENSION INTRAMUSCULAR; INTRAVENOUS at 10:28

## 2020-09-14 NOTE — DISCHARGE NOTE PROVIDER - NSDCMRMEDTOKEN_GEN_ALL_CORE_FT
amoxicillin-clavulanate 1000 mg-62.5 mg oral tablet, extended release: 2 tab(s) orally 2 times a day    amoxicillin-clavulanate 875 mg-125 mg oral tablet: 875 milligram(s) orally 2 times a day

## 2020-09-14 NOTE — DISCHARGE NOTE PROVIDER - CARE PROVIDER_API CALL
LESLY, MARLEEN  Internal Medicine  [address] [place] [address]  Phone: [phone]  Fax: [phone]  Established Patient  Follow Up Time:

## 2020-09-14 NOTE — DISCHARGE NOTE PROVIDER - NSDCCAREPROVSEEN_GEN_ALL_CORE_FT
Víctor Carrera - attending physician  Xavi Cobos - resident physician  Dahlia Rodgers - resident physician

## 2020-09-14 NOTE — DISCHARGE NOTE NURSING/CASE MANAGEMENT/SOCIAL WORK - PATIENT PORTAL LINK FT
You can access the FollowMyHealth Patient Portal offered by [facility] by registering at the following website: [email or web address]. By joining [facility] [employer]’s FollowMyHealth portal, you will also be able to view your health information using other applications (apps) compatible with our system.

## 2020-09-14 NOTE — PROGRESS NOTE ADULT - SUBJECTIVE AND OBJECTIVE BOX
Patient seen and examined. Agree with resident's findings, assessment and plan. Agree with discharge diagnoses, as well as with plan of care and goals.  Time spent: 35 minutes for evaluation, plan of care, patient education, medication reconciliation, coordination of care, follow ups and transition to outpatient.     PE: Aox3, lungs clear, CV RRR, no m/r/g, abd soft, nt ext wwp no sig le edema, R hand wounds sig improved on abx mild erythema and swelling most in R thumb noted improved per patient.     Labs/cx reviewed- NGTD    69F with R hand cat bite, clinically improved on abx, dc on po abx today, f/u any surgical recs before dc and appropriate f/u with hand surg vs PCP

## 2020-09-14 NOTE — DISCHARGE NOTE PROVIDER - NSDCCPCAREPLAN_GEN_ALL_CORE_FT
PRINCIPAL DISCHARGE DIAGNOSIS  Diagnosis: Cellulitis  Assessment and Plan of Treatment: You came in with swelling, redness, and pain around your right thumb, left ring finger and forearm where you were bitten by a cat. This is due to an infection of the skin around that area, this infection is called cellulitis. Cellulitis is treated with antibiotics. You were given IV antibiotics (Unasyn) with much improvement in your symptoms. Continue to take Augmentin for __ days. Follow up with your primary care doctor in 2 weeks or return to the ED if there is no futher improvement in your symptoms, if you experience fevers or development of pus/drainage from the site.       PRINCIPAL DISCHARGE DIAGNOSIS  Diagnosis: Cellulitis  Assessment and Plan of Treatment: You came in with swelling, redness, and pain around your right thumb, left ring finger and forearm where you were bitten by a cat. This is due to an infection of the skin around that area, this infection is called cellulitis. Cellulitis is treated with antibiotics. You were given IV antibiotics (Unasyn) with much improvement in your symptoms. Continue to take Augmentin twice daily to complete a 7 day course (last day will be 9/19). Follow up with your primary care doctor in 2 weeks or return to the ED if there is no futher improvement in your symptoms, if you experience fevers or development of pus/drainage from the site.       PRINCIPAL DISCHARGE DIAGNOSIS  Diagnosis: Cellulitis  Assessment and Plan of Treatment: You came in with swelling, redness, and pain around your right thumb, left ring finger and forearm where you were bitten by a cat. This is due to an infection of the skin around that area, this infection is called cellulitis. Cellulitis is treated with antibiotics. You were given IV antibiotics (Unasyn) with much improvement in your symptoms. Continue to take Augmentin 875mg twice daily to complete a 7 day course (last day will be 9/19). Follow up with your primary care doctor in 2 weeks or return to the ED if there is no futher improvement in your symptoms, if you experience fevers or development of pus/drainage from the site.

## 2020-09-14 NOTE — PROGRESS NOTE ADULT - ATTENDING COMMENTS
35 minutes spent on total encounter; more than 50% of the visit was spent counseling and/or coordinating care by the attending physician.     Plan discussed with housestaff
patient examined and discharge instruction discussed with patient and medical resident

## 2020-09-14 NOTE — DISCHARGE NOTE PROVIDER - HOSPITAL COURSE
69 healthy F PMH OA, p/w multiple cat bites/scratches after a cat attacked her at an animal clinic. Was given Augmentin, admitted for worsening redness, pain and swelling in R thumb. Started on Unasyn 3g IV with improvement in erythema and improved R thumb mobility, switched to Augmentin.      #Cellulitis of R thumb and L ring finger  #Cat bite/scratch  #Numerous scratches in UEs  -Pt presenting 2 days ago to [facility] in context of recent cat scratches and bites, having received augmentin with endorsed interval improvement. VSS, nontoxic appearance, and laboratroy findings showing no leukocytosis or any other systemic involvement. Imaging findings unconcerning for any fracture-dislocation/ foreign body involvement.   -s/p Unasyn 9/12-9/13, switched to Augmentin    #Osteoarthritis.  Has chronic OA, says she takes Motrin on work days as above, has never had any GI pain.   -Stable 69 healthy F PMH OA, p/w multiple cat bites/scratches after a cat attacked her at an animal clinic. Was given Augmentin, admitted for worsening redness, pain and swelling in R thumb. Started on Unasyn 3g IV with improvement in erythema and improved R thumb mobility, switched to Augmentin PO to finish 7 day course.      #Cellulitis of R thumb and L ring finger  #Cat bite/scratch  #Numerous scratches in UEs  -Pt presenting 2 days ago to [facility] in context of recent cat scratches and bites, having received augmentin with endorsed interval improvement. VSS, nontoxic appearance, and laboratroy findings showing no leukocytosis or any other systemic involvement. Imaging findings unconcerning for any fracture-dislocation/ foreign body involvement.   -s/p Unasyn 9/12-9/13, switched to Augmentin (last day will be 9/19)    #Osteoarthritis.  Has chronic OA, says she takes Motrin on work days as above, has never had any GI pain.   -Stable 69 healthy F PMH OA, p/w multiple cat bites/scratches after a cat attacked her at an animal clinic. Was given Augmentin, admitted for worsening redness, pain and swelling in R thumb. Started on Unasyn 3g IV with improvement in erythema and improved R thumb mobility, switched to Augmentin 875mg BID to finish 7 day course.      #Cellulitis of R thumb and L ring finger  #Cat bite/scratch  #Numerous scratches in UEs  -Pt presenting 2 days ago to [facility] in context of recent cat scratches and bites, having received augmentin with endorsed interval improvement. VSS, nontoxic appearance, and laboratroy findings showing no leukocytosis or any other systemic involvement. Imaging findings unconcerning for any fracture-dislocation/ foreign body involvement.   -s/p Unasyn 9/12-9/13, switched to Augmentin 875mg BID (last day will be 9/19)    #Osteoarthritis.  Has chronic OA, says she takes Motrin on work days as above, has never had any GI pain.   -Stable

## 2020-09-16 LAB
CULTURE RESULTS: SIGNIFICANT CHANGE UP
CULTURE RESULTS: SIGNIFICANT CHANGE UP
SPECIMEN SOURCE: SIGNIFICANT CHANGE UP
SPECIMEN SOURCE: SIGNIFICANT CHANGE UP
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
## 2020-09-21 DIAGNOSIS — S60.475A: ICD-10-CM

## 2020-09-21 DIAGNOSIS — L03.011 CELLULITIS OF RIGHT FINGER: ICD-10-CM

## 2020-09-21 DIAGNOSIS — M17.12 UNILATERAL PRIMARY OSTEOARTHRITIS, LEFT KNEE: ICD-10-CM
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
## 2020-09-21 DIAGNOSIS — S61.255A OPEN BITE OF LEFT RING FINGER WITHOUT DAMAGE TO NAIL, INITIAL ENCOUNTER: ICD-10-CM

## 2020-09-21 DIAGNOSIS — S61.258A: ICD-10-CM

## 2020-09-21 DIAGNOSIS — L03.012 CELLULITIS OF LEFT FINGER: ICD-10-CM

## 2020-10-29 PROCEDURE — 90662 IIV NO PRSV INCREASED AG IM: CPT

## 2020-10-29 PROCEDURE — 82962 GLUCOSE BLOOD TEST: CPT

## 2020-10-29 PROCEDURE — 87635 SARS-COV-2 COVID-19 AMP PRB: CPT

## 2020-10-29 PROCEDURE — 96374 THER/PROPH/DIAG INJ IV PUSH: CPT

## 2020-10-29 PROCEDURE — 80048 BASIC METABOLIC PNL TOTAL CA: CPT

## 2020-10-29 PROCEDURE — 99285 EMERGENCY DEPT VISIT HI MDM: CPT | Mod: 25

## 2020-10-29 PROCEDURE — 85027 COMPLETE CBC AUTOMATED: CPT

## 2020-10-29 PROCEDURE — 36415 COLL VENOUS BLD VENIPUNCTURE: CPT

## 2020-10-29 PROCEDURE — 86803 HEPATITIS C AB TEST: CPT

## 2020-10-29 PROCEDURE — 80053 COMPREHEN METABOLIC PANEL: CPT

## 2020-10-29 PROCEDURE — 87040 BLOOD CULTURE FOR BACTERIA: CPT

## 2020-10-29 PROCEDURE — 85025 COMPLETE CBC W/AUTO DIFF WBC: CPT

## 2020-10-29 PROCEDURE — U0003: CPT

## 2022-05-03 NOTE — DISCHARGE NOTE NURSING/CASE MANAGEMENT/SOCIAL WORK - NURSING SECTION COMPLETE
Strain of left quadriceps muscle, fascia and tendon, initial encounter    
Patient/Caregiver provided printed discharge information.

## 2022-09-22 NOTE — ED ADULT NURSE NOTE - CAS DISCH TRANSFER METHOD
Fluence (J/Cm2): 15
Spot Size: Finesse Adapter Size: 15 x 15 mm square
Detail Level: Zone
Repetition Rate (Hz): 10
Post-Care Instructions: I reviewed with the patient in detail post-care instructions. Patient should stay away from the sun and wear sun protection until treated areas are fully healed.
Pulse Duration: 20
Hide Repetition Rate?: No
Pulse Duration Units: milliseconds
Spot Size: Finesse Adapter Size: 15 x 45 mm (No Finesse Adapter)
Fluence (J/Cm2): 25
Passes: 1
Anesthesia Volume In Cc: 0
Additional Comments (Optional): *Pt’s hair growth is much finer and she may need only one or two more txs.
Preprocedure Text: The treatment areas were thoroughly cleaned. Clear ultrasound gel was applied to the treatment area. The area was treated with no immediate stacking of pulses.
Post Procedure Text: The patient tolerated the procedure well. Post care was reviewed with the patient. Hydrocortisone applied. Mild erythema on face.
Cooling ?: Yes
Consent: Written consent obtained, risks reviewed including but not limited to crusting, scabbing, blistering, scarring, darker or lighter pigmentary change, bruising, and/or incomplete.
Walking

## 2022-10-04 NOTE — ED PROVIDER NOTE - CCCP TRG CHIEF CMPLNT
10/03/22 2202   Patient Assessment/Suction   Level of Consciousness (AVPU) alert   Respiratory Effort Unlabored   Expansion/Accessory Muscles/Retractions expansion symmetric   All Lung Fields Breath Sounds coarse   Rhythm/Pattern, Respiratory assisted mechanically   Cough Frequency infrequent   Cough Type good;nonproductive   PRE-TX-O2   O2 Device (Oxygen Therapy) BiPAP   $ Is the patient on Low Flow Oxygen? Yes   Oxygen Concentration (%) 30   SpO2 98 %   Pulse Oximetry Type Continuous   $ Pulse Oximetry - Multiple Charge Pulse Oximetry - Multiple   Pulse 94   Resp (!) 27   Ready to Wean/Extubation Screen   FIO2<=50 (chart decimal) 0.3   Preset CPAP/BiPAP Settings   $ Is patient using? Yes   Size of Mask Large   Sized Appropriately? Yes   Equipment Type V60   Airway Device Type large full face mask   Ipap 12   EPAP (cm H2O) 6   Pressure Support (cm H2O) 6   Set Rate (Breaths/Min) 12   ITime (sec) 1   Rise Time (sec) 3   Patient CPAP/BiPAP Settings   RR Total (Breaths/Min) 20   Tidal Volume (mL) 705   VE Minute Ventilation (L/min) 14 L/min   Peak Inspiratory Pressure (cm H2O) 15   TiTOT (%) 30   Total Leak (L/Min) 25   Patient Trigger - ST Mode Only (%) 99   CPAP/BiPAP Alarms   High Pressure (cm H2O) 50   Low Pressure (cm H2O) 10   High RR (breaths/min) 50   Low RR (breaths/min) 10   Apnea (Sec) 20   Education   $ Education BiPAP;15 min      wound check

## 2023-08-17 NOTE — H&P ADULT - NSHPROSALLOTHERNEGRD_GEN_ALL_CORE
All other review of systems negative, except as noted in HPI [No Acute Distress] : no acute distress [Normal Sclera/Conjunctiva] : normal sclera/conjunctiva [Normal Outer Ear/Nose] : the outer ears and nose were normal in appearance [Normal Rate] : normal rate  [Regular Rhythm] : with a regular rhythm [No Edema] : there was no peripheral edema [No Palpable Aorta] : no palpable aorta [Normal] : soft, non-tender, non-distended, no masses palpated, no HSM and normal bowel sounds [de-identified] : murmur [de-identified] : + comodonal acne over chin

## 2024-09-28 NOTE — PATIENT PROFILE ADULT - IS THERE A SUSPICION OF ABUSE/NEGLIGENCE?
[[email or web address]_ALL_OB_FT:[email or web address]=],[NS_DeliveryRN_OBGYN_ALL_OB_FT:[email or web address]=] no

## 2024-12-17 NOTE — DISCHARGE NOTE PROVIDER - YES NO FOR MLM POSITIVE OR NEGATIVE COVID RESULT
[facility] Call Center    Phone Message    May a detailed message be left on voicemail: yes     Reason for Call: Other: Requesting chart notes. Please fax to      Action Taken: Other: gen surg     Travel Screening: Not Applicable     Date of Service:                                                                      ,

## 2025-02-18 NOTE — ED ADULT TRIAGE NOTE - ESI TRIAGE ACUITY LEVEL, MLM
[facility]  [address] [place] [address]  Phone: [phone]  Fax: [phone]  Follow Up Time:    3